# Patient Record
Sex: FEMALE | Race: WHITE | NOT HISPANIC OR LATINO | Employment: FULL TIME | URBAN - METROPOLITAN AREA
[De-identification: names, ages, dates, MRNs, and addresses within clinical notes are randomized per-mention and may not be internally consistent; named-entity substitution may affect disease eponyms.]

---

## 2018-06-01 ENCOUNTER — OFFICE VISIT CONVERTED (OUTPATIENT)
Dept: FAMILY MEDICINE CLINIC | Facility: CLINIC | Age: 53
End: 2018-06-01
Attending: NURSE PRACTITIONER

## 2018-06-01 ENCOUNTER — CONVERSION ENCOUNTER (OUTPATIENT)
Dept: FAMILY MEDICINE CLINIC | Facility: CLINIC | Age: 53
End: 2018-06-01

## 2018-07-02 ENCOUNTER — CONVERSION ENCOUNTER (OUTPATIENT)
Dept: FAMILY MEDICINE CLINIC | Facility: CLINIC | Age: 53
End: 2018-07-02

## 2018-07-02 ENCOUNTER — OFFICE VISIT CONVERTED (OUTPATIENT)
Dept: FAMILY MEDICINE CLINIC | Facility: CLINIC | Age: 53
End: 2018-07-02
Attending: NURSE PRACTITIONER

## 2018-08-29 ENCOUNTER — OFFICE VISIT CONVERTED (OUTPATIENT)
Dept: FAMILY MEDICINE CLINIC | Facility: CLINIC | Age: 53
End: 2018-08-29
Attending: NURSE PRACTITIONER

## 2018-08-29 ENCOUNTER — CONVERSION ENCOUNTER (OUTPATIENT)
Dept: FAMILY MEDICINE CLINIC | Facility: CLINIC | Age: 53
End: 2018-08-29

## 2020-04-10 ENCOUNTER — OFFICE VISIT CONVERTED (OUTPATIENT)
Dept: FAMILY MEDICINE CLINIC | Facility: CLINIC | Age: 55
End: 2020-04-10
Attending: NURSE PRACTITIONER

## 2020-04-21 ENCOUNTER — OFFICE VISIT CONVERTED (OUTPATIENT)
Dept: FAMILY MEDICINE CLINIC | Facility: CLINIC | Age: 55
End: 2020-04-21
Attending: NURSE PRACTITIONER

## 2020-10-20 ENCOUNTER — HOSPITAL ENCOUNTER (OUTPATIENT)
Dept: LAB | Facility: HOSPITAL | Age: 55
Discharge: HOME | End: 2020-10-20
Attending: NURSE PRACTITIONER

## 2020-10-20 LAB
ALBUMIN SERPL-MCNC: 4.3 G/DL (ref 3.5–5)
ALBUMIN/GLOB SERPL: 1.5 {RATIO} (ref 1.4–2.6)
ALP SERPL-CCNC: 123 U/L (ref 53–141)
ALT SERPL-CCNC: 17 U/L (ref 10–40)
ANION GAP SERPL CALC-SCNC: 18 MMOL/L (ref 8–19)
AST SERPL-CCNC: 17 U/L (ref 15–50)
BASOPHILS # BLD AUTO: 0.02 10*3/UL (ref 0–0.2)
BASOPHILS NFR BLD AUTO: 0.3 % (ref 0–3)
BILIRUB SERPL-MCNC: 0.32 MG/DL (ref 0.2–1.3)
BUN SERPL-MCNC: 18 MG/DL (ref 5–25)
BUN/CREAT SERPL: 23 {RATIO} (ref 6–20)
CALCIUM SERPL-MCNC: 9.2 MG/DL (ref 8.7–10.4)
CHLORIDE SERPL-SCNC: 101 MMOL/L (ref 99–111)
CHOLEST SERPL-MCNC: 256 MG/DL (ref 107–200)
CHOLEST/HDLC SERPL: 6.6 {RATIO} (ref 3–6)
CONV ABS IMM GRAN: 0.01 10*3/UL (ref 0–0.2)
CONV CO2: 22 MMOL/L (ref 22–32)
CONV IMMATURE GRAN: 0.1 % (ref 0–1.8)
CONV TOTAL PROTEIN: 7.1 G/DL (ref 6.3–8.2)
CREAT UR-MCNC: 0.79 MG/DL (ref 0.5–0.9)
DEPRECATED RDW RBC AUTO: 43.4 FL (ref 36.4–46.3)
EOSINOPHIL # BLD AUTO: 0.22 10*3/UL (ref 0–0.7)
EOSINOPHIL # BLD AUTO: 3.1 % (ref 0–7)
ERYTHROCYTE [DISTWIDTH] IN BLOOD BY AUTOMATED COUNT: 14.5 % (ref 11.7–14.4)
GFR SERPLBLD BASED ON 1.73 SQ M-ARVRAT: >60 ML/MIN/{1.73_M2}
GLOBULIN UR ELPH-MCNC: 2.8 G/DL (ref 2–3.5)
GLUCOSE SERPL-MCNC: 222 MG/DL (ref 65–99)
HCT VFR BLD AUTO: 38.2 % (ref 37–47)
HDLC SERPL-MCNC: 39 MG/DL (ref 40–60)
HGB BLD-MCNC: 12.3 G/DL (ref 12–16)
LDLC SERPL CALC-MCNC: 168 MG/DL (ref 70–100)
LYMPHOCYTES # BLD AUTO: 2.08 10*3/UL (ref 1–5)
LYMPHOCYTES NFR BLD AUTO: 29.2 % (ref 20–45)
MCH RBC QN AUTO: 26.7 PG (ref 27–31)
MCHC RBC AUTO-ENTMCNC: 32.2 G/DL (ref 33–37)
MCV RBC AUTO: 82.9 FL (ref 81–99)
MONOCYTES # BLD AUTO: 0.39 10*3/UL (ref 0.2–1.2)
MONOCYTES NFR BLD AUTO: 5.5 % (ref 3–10)
NEUTROPHILS # BLD AUTO: 4.41 10*3/UL (ref 2–8)
NEUTROPHILS NFR BLD AUTO: 61.8 % (ref 30–85)
NRBC CBCN: 0 % (ref 0–0.7)
OSMOLALITY SERPL CALC.SUM OF ELEC: 293 MOSM/KG (ref 273–304)
PLATELET # BLD AUTO: 236 10*3/UL (ref 130–400)
PMV BLD AUTO: 11.1 FL (ref 9.4–12.3)
POTASSIUM SERPL-SCNC: 4.2 MMOL/L (ref 3.5–5.3)
RBC # BLD AUTO: 4.61 10*6/UL (ref 4.2–5.4)
SODIUM SERPL-SCNC: 137 MMOL/L (ref 135–147)
TRIGL SERPL-MCNC: 246 MG/DL (ref 40–150)
TSH SERPL-ACNC: 3.28 M[IU]/L (ref 0.27–4.2)
VLDLC SERPL-MCNC: 49 MG/DL (ref 5–37)
WBC # BLD AUTO: 7.13 10*3/UL (ref 4.8–10.8)

## 2020-10-22 ENCOUNTER — HOSPITAL ENCOUNTER (OUTPATIENT)
Dept: LAB | Facility: HOSPITAL | Age: 55
Discharge: HOME | End: 2020-10-22
Attending: NURSE PRACTITIONER

## 2020-10-22 LAB
EST. AVERAGE GLUCOSE BLD GHB EST-MCNC: 235 MG/DL
HBA1C MFR BLD: 9.8 % (ref 3.5–5.7)

## 2020-10-23 ENCOUNTER — OFFICE VISIT CONVERTED (OUTPATIENT)
Dept: FAMILY MEDICINE CLINIC | Facility: CLINIC | Age: 55
End: 2020-10-23
Attending: NURSE PRACTITIONER

## 2020-10-23 ENCOUNTER — CONVERSION ENCOUNTER (OUTPATIENT)
Dept: FAMILY MEDICINE CLINIC | Facility: CLINIC | Age: 55
End: 2020-10-23

## 2020-12-16 ENCOUNTER — OFFICE VISIT CONVERTED (OUTPATIENT)
Dept: FAMILY MEDICINE CLINIC | Facility: CLINIC | Age: 55
End: 2020-12-16
Attending: NURSE PRACTITIONER

## 2021-01-07 ENCOUNTER — OFFICE VISIT CONVERTED (OUTPATIENT)
Dept: OTOLARYNGOLOGY | Facility: CLINIC | Age: 56
End: 2021-01-07
Attending: NURSE PRACTITIONER

## 2021-05-10 NOTE — H&P
History and Physical      Patient Name: Clementina Rios   Patient ID: [id number]   Sex: Female   YOB: 1965    Primary Care Provider: Daniel KNOX   Referring Provider: Daniel KNOX    Visit Date: January 7, 2021    Provider: ABILIO Jimenez   Location: [facility]   Location Address: [address] [place] [address], [address] [place] [address]  [id number]   Location Phone: [phone]          Chief Complaint     1. History of recurrent otitis media    2. Chronic rhinitis       History Of Present Illness  Clementina Rios is a 55 year old /White female who presents to the office today as a consult from Daniel KNOX.      She presents to the For evaluation of her ears and nose.  She reports for the past 20 years she has had ongoing issues with recurrent otitis media infections.  She states that she often has fluid in her middle ears.  She states that her hearing is muffled and she will occasionally have otalgia bilaterally.  However, it is worse on the right.  States that she has a frequent fullness sensation in her ears.  She denies any otorrhea.  She has never had any ear surgeries.  She does report chronic rhinitis.  She states that it is clear in nature but often pours out of her nose throughout the day.  It is worse whenever she eats or when she is up moving around.  She states that approximately a year ago she had some dental work done and since that time she has not been able to smell or taste.  She reports that she will occasionally be able to smell strong odors such as perfumes but typically she cannot smell much of anything.  She denies any recent head trauma but reports she has had 3 separate nasal fractures with the last 1 occurring in 2014.  She takes montelukast daily and was also prescribed azelastine nasal spray.  She reports that she only takes azelastine spray as needed and is not doing it on a daily basis.  She has not been seen by an allergist.  She  "denies any issues with recurrent sinusitis.       Past Medical History  Cystocele; HBP (high blood pressure); Head injury; Hemorrhoids; HLD (hyperlipidemia); Night sweat; Renal calculus or stone; Seasonal allergies; Sinus trouble; Sinusitis, acute; DIONI (stress urinary incontinence, female)         Past Surgical History  Bladder Repair; Colon Resection; Colonoscopy; Hysterectomy; MENISCUS TEAR; Oopherectomy         Medication List  azelastine 137 mcg (0.1 %) nasal aerosol,spray; buspirone 7.5 mg oral tablet; Jardiance 25 mg oral tablet; Lunesta 3 mg oral tablet; metformin 500 mg oral tablet extended release 24 hr; Singulair 10 mg oral tablet; Zetia 10 mg oral tablet; Zofran 4 mg oral tablet; Zoloft 50 mg oral tablet         Allergy List  NO KNOWN DRUG ALLERGIES         Family Medical History  Renal Calculus; Family history of heart disease; Family history of diabetes mellitus         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); Sales; ; 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)         Immunizations  Name Date Admin   Influenza Refused         Review of Systems  · Constitutional  o Denies  o : fever, night sweats, weight loss  · Eyes  o Denies  o : discharge from eye, impaired vision  · HENT  o Admits  o : *See HPI  · Cardiovascular  o Admits  o : irregular heart beats  · Respiratory  o Admits  o : shortness of breath  o Denies  o : wheezing, coughing up blood  · Gastrointestinal  o Denies  o : heartburn, reflux, vomiting blood  · Genitourinary  o Admits  o : frequency  · Integument  o Admits  o : skin dryness  o Denies  o : rash  · Neurologic  o Denies  o : seizures, loss of balance, loss of consciousness, dizziness  · Endocrine  o Denies  o : cold intolerance, heat intolerance  · Heme-Lymph  o Denies  o : easy bleeding, anemia      Vitals  Date Time BP Position Site L\R Cuff Size HR RR TEMP (F) WT  HT  BMI kg/m2 BSA m2 O2 Sat FR L/min FiO2 HC       01/07/2021 10:43 AM        97.3 213lbs 16oz 5'  6\" 34.54 2.13             Physical " Examination  · Constitutional  o Appearance  o : well developed, well-nourished, alert and in no acute distress, voice clear and strong  · Head and Face  o Head  o :   § Inspection  § : no deformities or lesions  o Face  o :   § Inspection  § : No facial lesions; House-Brackmann I/VI bilaterally  § Palpation  § : No TMJ crepitus nor  muscle tenderness bilaterally  · Eyes  o Vision  o :   § Visual Fields  § : Extraocular movements are intact. No spontaneous or gaze-induced nystagmus.  o Conjunctivae  o : clear  o Sclerae  o : clear  o Pupils and Irises  o : pupils equal, round, and reactive to light.   · Ears, Nose, Mouth and Throat  o Ears  o :   § External Ears  § : appearance within normal limits, no lesions present  § Otoscopic Examination  § : tympanic membrane appearance within normal limits bilaterally without perforations, well-aerated middle ears  § Hearing  § : intact to conversational voice both ears. Tuning fork testing: Griffin: No lateralization. Rinne: Positive bilaterally  o Nose  o :   § External Nose  § : appearance normal  § Intranasal Exam  § : mucosa within normal limits, vestibules normal, no intranasal lesions present, septum midline, sinuses non tender to percussion  o Oral Cavity  o :   § Oral Mucosa  § : oral mucosa normal without pallor or cyanosis  § Lips  § : lip appearance normal  § Teeth  § : normal dentition for age  § Gums  § : gums pink, non-swollen, no bleeding present  § Tongue  § : tongue appearance normal; normal mobility  § Palate  § : hard palate normal, soft palate appearance normal with symmetric mobility  o Throat  o :   § Oropharynx  § : no inflammation or lesions present, tonsils within normal limits  · Neck  o Inspection/Palpation  o : normal appearance, no masses or tenderness, trachea midline; thyroid size normal, nontender, no nodules or masses present on palpation  · Respiratory  o Respiratory Effort  o : breathing unlabored  o Inspection of Chest  o : normal  appearance, no retractions  · Lymphatic  o Neck  o : no lymphadenopathy present  o Supraclavicular Nodes  o : no lymphadenopathy present  o Preauricular Nodes  o : no lymphadenopathy present  · Skin and Subcutaneous Tissue  o General Inspection  o : Regarding face and neck - there are no rashes present, no lesions present, and no areas of discoloration  · Neurologic  o Cranial Nerves  o : cranial nerves II-XII are grossly intact bilaterally  o Gait and Station  o : normal gait, able to stand without diffculty  · Psychiatric  o Judgement and Insight  o : judgment and insight intact  o Mood and Affect  o : mood normal, affect appropriate          Assessment  · Hearing loss     389.9/H91.90  · Chronic rhinitis     472.0/J31.0  · History of otitis media     V12.49/Z86.69      Plan  · Medications  o Neilmed Sinus Rinse Complete sinus irrigation packet with rinse device   SIG: Take 1 packet with rinse device by sinus irrigation route daily   DISP: (60) Dose Pack with 1 refills  Prescribed on 01/07/2021     o fluticasone propionate 50 mcg/actuation nasal spray,suspension   SIG: spray 1 - 2 sprays (50 - 100 mcg) in each nostril by intranasal route once daily for 30 days   DISP: (1) Bottle with 2 refills  Prescribed on 01/07/2021     o Medications have been Reconciled  o Transition of Care or Provider Policy  · Instructions  o She presents to the For evaluation of her ears and nose. She reports for the past 20 years she has had ongoing issues with recurrent otitis media infections. She states that she often has fluid in her middle ears. She states that her hearing is muffled and she will occasionally have otalgia bilaterally. However, it is worse on the right. States that she has a frequent fullness sensation in her ears. She denies any otorrhea. She has never had any ear surgeries. She does report chronic rhinitis. She states that it is clear in nature but often pours out of her nose throughout the day. It is worse whenever  she eats or when she is up moving around. She states that approximately a year ago she had some dental work done and since that time she has not been able to smell or taste. She reports that she will occasionally be able to smell strong odors such as perfumes but typically she cannot smell much of anything. She denies any recent head trauma but reports she has had 3 separate nasal fractures with the last 1 occurring in 2014. She takes montelukast daily and was also prescribed azelastine nasal spray. She reports that she only takes azelastine spray as needed and is not doing it on a daily basis. She has not been seen by an allergist. She denies any issues with recurrent sinusitis. On examination today bilateral tympanic membrane appearance is within normal limits. She does have some calcium deposits present on her right tympanic membrane but I do not see any middle ear fluid. Tuning fork testing is normal with Griffin showing no lateralization and Rinne positive bilaterally. Her nose is free of any polyps or purulence. Hematoma start her on once daily sinus rinses and I will add fluticasone to her daily regimen. Advised her to use the azelastine spray as directed and not on an as-needed basis. I will see her back in 3 to 4 weeks at which time we will get an audiogram and tympanogram to further evaluate her ears.  o Electronically Identified Patient Education Materials Provided Electronically  · Correspondence  o ENT Letter to Referring MD (Daniel KNOX) - 01/07/2021            Electronically Signed by: ABILIO Jimenez -Author on January 7, 2021 01:00:54 PM

## 2021-05-12 NOTE — PROGRESS NOTES
Progress Note      Patient Name: Clementina Rios   Patient ID: [id number]   Sex: Female   YOB: 1965    Primary Care Provider: Daniel KNOX   Referring Provider: Daniel KNOX    Visit Date: April 10, 2020    Provider: ABILIO Dickerson   Location: [facility]   Location Address: [address] [place] [address], [address] [place] [address]  [id number]   Location Phone: [phone]          Chief Complaint     Cough, sinus pressure, body aches, cold chills, loose stools for 1-2 weeks       History Of Present Illness  Clementina Rios is a 55 year old /White female who presents for evaluation and treatment of:      Patient presents to the office today with complaints of sinus pressure congestion, general body aches, fever and chills.  Patient also states that she has had a cough.  She denies any shortness of breath at this time.  Patient does state that she has had mild nausea with the onset of symptoms.  She states that her symptoms have been present for 3 weeks.       Past Medical History  Disease Name Date Onset Notes   Cystocele --  --    HBP (high blood pressure) --  --    Head injury --  --    Hemorrhoids --  --    HLD (hyperlipidemia) --  --    Night sweat --  --    Renal calculus or stone --  --    Seasonal allergies --  --    Sinus trouble --  --    DIONI (stress urinary incontinence, female) --  --          Past Surgical History  Procedure Name Date Notes   Bladder Repair --  2014   Colon Resection --  --    Colonoscopy --  --    Hysterectomy --  --    MENISCUS TEAR --  --    Oopherectomy --  --          Medication List  Name Date Started Instructions   atorvastatin 40 mg oral tablet 06/07/2019 take 1 tablet (40 mg) by oral route once daily for 90 days   Claritin 10 mg oral tablet 08/30/2018 take 1 tablet (10 mg) by oral route once daily   Lunesta 3 mg oral tablet  take 1 tablet (3 mg) by oral route once daily at bedtime   spironolactone 25 mg oral tablet 06/07/2019 take 1 tablet  "(25 mg) by oral route every other day for 90 days   Zoloft 50 mg oral tablet  take 1 tablet (50 mg) by oral route once daily         Allergy List  Allergen Name Date Reaction Notes   NO KNOWN DRUG ALLERGIES --  --  --        Allergies Reconciled  Family Medical History  Disease Name Relative/Age Notes   Heart Disease Mother/   --    Renal Calculus Daughter/   --    Diabetes Father/   --          Social History  Finding Status Start/Stop Quantity Notes   Alcohol Never --/-- --  --    Sales --  --/-- --  --     --  --/-- --  4 children   Tobacco Never --/-- --  --          Review of Systems  · Constitutional  o Admits  o : chills, body aches  o Denies  o : fatigue, night sweats  · Eyes  o Denies  o : double vision, blurred vision  · HENT  o Admits  o : sinus pain  o Denies  o : vertigo, recent head injury  · Breasts  o Denies  o : abnormal changes in breast size, additional breast symptoms except as noted in the HPI  · Cardiovascular  o Denies  o : chest pain, irregular heart beats  · Respiratory  o Admits  o : cough  o Denies  o : shortness of breath, productive cough  · Gastrointestinal  o Denies  o : nausea, vomiting  · Genitourinary  o Denies  o : dysuria, urinary retention  · Integument  o Denies  o : hair growth change, new skin lesions  · Neurologic  o Denies  o : altered mental status, seizures  · Musculoskeletal  o Denies  o : joint swelling, limitation of motion  · Endocrine  o Denies  o : cold intolerance, heat intolerance  · Heme-Lymph  o Denies  o : petechiae, lymph node enlargement or tenderness  · Allergic-Immunologic  o Denies  o : frequent illnesses      Vitals  Date Time BP Position Site L\R Cuff Size HR RR TEMP (F) WT  HT  BMI kg/m2 BSA m2 O2 Sat HC       04/10/2020 09:28 /74 Sitting    62 - R 18 96.2  6'  6\"   95 %          Physical Examination  · Constitutional  o Appearance  o : well developed, well-nourished, no obvious deformities present  · Ears, Nose, Mouth and " Throat  o Ears  o :   § External Ears  § : appearance within normal limits, no lesions present  § Otoscopic Examination  § : fluid bubbles present behind tympanic membrane   § Hearing  § : intact to conversational voice both ears  o Nose  o :   § External Nose  § : appearance normal  § Intranasal Exam  § : maxillary sinus tender to exam by percussion   § Nasopharynx  § : no lesions or inflammation  o Oral Cavity  o :   § Oral Mucosa  § : oral mucosa normal without pallor or cyanosis  § Lips  § : lip appearance normal  § Teeth  § : normal dentition for age  § Gums  § : gums pink, non-swollen, no bleeding present  § Tongue  § : tongue appearance normal  § Palate  § : hard palate normal, soft palate appearance normal  o Throat  o :   § Oropharynx  § : no inflammation or lesions present, tonsils within normal limits  § Hypopharynx  § : appearance within normal limits, superior epiglottis within normal limits  · Respiratory  o Respiratory Effort  o : breathing unlabored  o Auscultation of Lungs  o : normal breath sounds throughout  · Cardiovascular  o Heart  o :   § Auscultation of Heart  § : regular rate and rhythm, no murmurs, gallops or rubs  o Peripheral Vascular System  o :   § Extremities  § : no cyanosis, clubbing or edema  · Psychiatric  o General  o : Appropriate mood and affect noted  o Mood and Affect  o : mood normal, affect appropriate  o Presence of Abnormal Thoughts  o : no hallucinations, no delusions present, no psychotic thoughts          Results  · In-Office Procedures  o Lab procedure  § Rapid strep screen (96820)   § Beta Strep Gp A Culture: Negative   § Internal Control Verified?: Yes   § IOP - Influenza A/B Test (04325)   § Influenza A: Negative   § Influenza B: Negative   § Internal Control Verified?: Yes       Assessment  · Sinusitis, acute     461.9/J01.90  · Eustachian tube dysfunction, bilateral     381.81/H69.83    Problems Reconciled  Plan  · Orders  o ACO-39: Current medications updated and  reviewed () - - 04/10/2020  o ACO-14: Influenza immunization was not administered for reasons documented () - - 04/10/2020  · Medications  o Augmentin 875-125 mg oral tablet   SIG: take 1 tablet by oral route every 12 hours for 10 days   DISP: (20) tablets with 0 refills  Prescribed on 04/10/2020     o ondansetron 4 mg oral tablet,disintegrating   SIG: dissolve 1 tablet by oral route every 12 hours as needed   DISP: (20) tablets with 0 refills  Prescribed on 04/10/2020     o azelastine 137 mcg (0.1 %) nasal aerosol,spray   SIG: spray 2 sprays in each nostril by intranasal route 2 times per day   DISP: (1) 30 ml squeez btl with 0 refills  Prescribed on 04/10/2020     o Medications have been Reconciled  o Transition of Care or Provider Policy  · Instructions  o Rest. Increase Fluids.  o Patient was educated/instructed on their diagnosis, treatment and medications prior to discharge from the clinic today.  o Time spent with the patient was minutes, more than 50% face to face.  o Electronically Identified Patient Education Materials Provided Electronically  · Disposition  o Call or Return if symptoms worsen or persist.            Electronically Signed by: ABILIO Dickerson -Author on April 10, 2020 09:57:56 AM

## 2021-05-12 NOTE — PROGRESS NOTES
Progress Note      Patient Name: Clementina Rios   Patient ID: [id number]   Sex: Female   YOB: 1965    Primary Care Provider: Daniel KNOX   Referring Provider: Daniel KNOX    Visit Date: April 21, 2020    Provider: ABILIO Dickerson   Location: [facility]   Location Address: [address] [place] [address], [address]  [id number]   Location Phone: [phone]          Chief Complaint  · Right ear pain/ pressure since 4/20/20  · PND, cough for 1 week  · Patient is currently on Augmentin and nasal spray      History Of Present Illness  Clementina Rios is a 55 year old /White female who presents for evaluation and treatment of:      Patient presents to the office today with complaints of right ear pain.  She states that she has been having a pressure sensation since yesterday.  Patient also states that she has postnasal drip and is currently taking Augmentin for sinus infection.  Patient is not currently taking any allergy medicine other than her Astelin       Past Medical History  Cystocele; HBP (high blood pressure); Head injury; Hemorrhoids; HLD (hyperlipidemia); Night sweat; Renal calculus or stone; Seasonal allergies; Sinus trouble; DIONI (stress urinary incontinence, female)         Past Surgical History  Bladder Repair; Colon Resection; Colonoscopy; Hysterectomy; MENISCUS TEAR; Oopherectomy         Medication List  atorvastatin 40 mg oral tablet; Augmentin 875-125 mg oral tablet; azelastine 137 mcg (0.1 %) nasal aerosol,spray; Lunesta 3 mg oral tablet; ondansetron 4 mg oral tablet,disintegrating; Zoloft 50 mg oral tablet         Allergy List  NO KNOWN DRUG ALLERGIES         Family Medical History  Heart Disease; Renal Calculus; Diabetes         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); Sales; ; 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)         Immunizations  Name Date Admin   Influenza Refused         Review of Systems  · Constitutional  o Denies  o : fever, fatigue, weight loss, weight  "gain  · HENT  o Admits  o : postnasal drip, ear pain, ear fullness  · Cardiovascular  o Denies  o : lower extremity edema, claudication, chest pressure, palpitations  · Respiratory  o Admits  o : cough  o Denies  o : shortness of breath, wheezing, hemoptysis, dyspnea on exertion  · Gastrointestinal  o Denies  o : nausea, vomiting, diarrhea, constipation, abdominal pain      Vitals  Date Time BP Position Site L\R Cuff Size HR RR TEMP (F) WT  HT  BMI kg/m2 BSA m2 O2 Sat        04/21/2020 10:24 /78 Sitting    78 - R 18 97.9 210lbs 0oz 5'  6\" 33.89 2.11 95 %          Physical Examination  · Constitutional  o Appearance  o : well developed, well-nourished, no obvious deformities present  · Ears, Nose, Mouth and Throat  o Ears  o :   § External Ears  § : appearance within normal limits, no lesions present  § Otoscopic Examination  § : Fluid noted behind TM on right, no erythema noted left ear unremarkable with no erythema edema or perforation  § Hearing  § : intact to conversational voice both ears  o Nose  o :   § External Nose  § : appearance normal  § Intranasal Exam  § : mucosa within normal limits, vestibules normal, no intranasal lesions present, septum midline, sinuses non tender to percussion  § Nasopharynx  § : no lesions or inflammation  o Oral Cavity  o :   § Oral Mucosa  § : oral mucosa normal without pallor or cyanosis  § Lips  § : lip appearance normal  § Teeth  § : normal dentition for age  § Gums  § : gums pink, non-swollen, no bleeding present  § Tongue  § : tongue appearance normal  § Palate  § : hard palate normal, soft palate appearance normal  o Throat  o :   § Oropharynx  § : no inflammation or lesions present, tonsils within normal limits  § Hypopharynx  § : appearance within normal limits, superior epiglottis within normal limits  · Respiratory  o Respiratory Effort  o : breathing unlabored  o Auscultation of Lungs  o : normal breath sounds throughout  · Cardiovascular  o Heart  o : "   § Auscultation of Heart  § : regular rate and rhythm, no murmurs, gallops or rubs  o Peripheral Vascular System  o :   § Extremities  § : no cyanosis, clubbing or edema  · Psychiatric  o General  o : Appropriate mood and affect noted  o Mood and Affect  o : mood normal, affect appropriate  o Presence of Abnormal Thoughts  o : no hallucinations, no delusions present, no psychotic thoughts          Assessment  · Eustachian tube dysfunction, right     381.81/H69.81    Problems Reconciled  Plan  · Orders  o ACO-39: Current medications updated and reviewed () - - 04/21/2020  · Medications  o azelastine 137 mcg (0.1 %) nasal aerosol,spray   SIG: spray 2 sprays in each nostril by intranasal route 2 times per day   DISP: (1) 30 ml squeez btl with 0 refills  Refilled on 04/21/2020     o Claritin 10 mg oral tablet   SIG: take 1 tablet (10 mg) by oral route once daily   DISP: (30) tablets with 2 refills  Discontinued on 04/21/2020     o spironolactone 25 mg oral tablet   SIG: take 1 tablet (25 mg) by oral route every other day for 90 days   DISP: (90) tablets with 1 refills  Discontinued on 04/21/2020     o Medications have been Reconciled  o Transition of Care or Provider Policy  · Instructions  o Patient was educated/instructed on their diagnosis, treatment and medications prior to discharge from the clinic today.  o Time spent with the patient was minutes, more than 50% face to face.  · Disposition  o Call or Return if symptoms worsen or persist.            Electronically Signed by: ABILIO Dickerson -Author on April 21, 2020 10:54:18 AM

## 2021-05-13 NOTE — PROGRESS NOTES
Progress Note      Patient Name: Clementina Rios   Patient ID: [id number]   Sex: Female   YOB: 1965    Primary Care Provider: Daniel KNOX   Referring Provider: Daniel KNOX    Visit Date: October 23, 2020    Provider: ABILIO Dickerson   Location: [facility] [place]   Location Address: [address] [place] [address], [address] [place] [address]  [id number]   Location Phone: [phone]          Chief Complaint  · Talk about medications      History Of Present Illness  Clementina Rios is a 55 year old /White female who presents for evaluation and treatment of:      Patient presents to the office today to discuss her lab work.  I did discuss new onset diabetes with an A1c of 9.8%.  Patient does state that she eats a lot of carbs.  Education was provided to the patient regarding reducing her sugars and her carb intake.  Did discuss starting medications on the patient to help reduce her glucose levels.  Patient also had elevated LDL.  She states that she has not been taking her atorvastatin for the past 3 to 6 months.  Patient states that she has terrible myalgias on this medication.  I did discuss starting Crestor on the patient as it has lessened side effects.  Patient refuses to take Crestor at this time as well.       Past Medical History  Disease Name Date Onset Notes   Cystocele --  --    HBP (high blood pressure) --  --    Head injury --  --    Hemorrhoids --  --    HLD (hyperlipidemia) --  --    Night sweat --  --    Renal calculus or stone --  --    Seasonal allergies --  --    Sinus trouble --  --    DIONI (stress urinary incontinence, female) --  --          Past Surgical History  Procedure Name Date Notes   Bladder Repair --  2014   Colon Resection --  --    Colonoscopy --  --    Hysterectomy --  --    MENISCUS TEAR --  --    Oopherectomy --  --          Medication List  Name Date Started Instructions   azelastine 137 mcg (0.1 %) nasal aerosol,spray 04/21/2020 spray 2  sprays in each nostril by intranasal route 2 times per day   buspirone 7.5 mg oral tablet  take 1 tablet (7.5 mg) by oral route 2 times per day   Lunesta 3 mg oral tablet  take 1 tablet (3 mg) by oral route once daily at bedtime   Synjardy XR 25-1,000 mg oral tablet, IR - ER, biphasic 24hr 10/23/2020 take 1 tablet by oral route once daily in the morning with a meal   Zetia 10 mg oral tablet 10/23/2020 take 1 tablet (10 mg) by oral route once daily   Zoloft 50 mg oral tablet  take 1 tablet (50 mg) by oral route once daily         Allergy List  Allergen Name Date Reaction Notes   NO KNOWN DRUG ALLERGIES --  --  --        Allergies Reconciled  Family Medical History  Disease Name Relative/Age Notes   Heart Disease Mother/   --    Renal Calculus Daughter/   --    Diabetes Father/   --          Social History  Finding Status Start/Stop Quantity Notes   Alcohol Never --/-- --  --    Sales --  --/-- --  --     --  --/-- --  4 children   Tobacco Never --/-- --  --          Immunizations  NameDate Admin Mfg Trade Name Lot Number Route Inj VIS Given VIS Publication   InfluenzaRefused 04/10/2020 NE Not Entered  NE NE     Comments:          Review of Systems  · Constitutional  o Denies  o : fatigue, night sweats  · Eyes  o Denies  o : double vision, blurred vision  · HENT  o Denies  o : vertigo, recent head injury  · Breasts  o Denies  o : abnormal changes in breast size, additional breast symptoms except as noted in the HPI  · Cardiovascular  o Denies  o : chest pain, irregular heart beats  · Respiratory  o Denies  o : shortness of breath, productive cough  · Gastrointestinal  o Denies  o : nausea, vomiting  · Genitourinary  o Denies  o : dysuria, urinary retention  · Integument  o Denies  o : hair growth change, new skin lesions  · Neurologic  o Denies  o : altered mental status, seizures  · Musculoskeletal  o Denies  o : joint swelling, limitation of motion  · Endocrine  o Denies  o : cold intolerance, heat  "intolerance  · Heme-Lymph  o Denies  o : petechiae, lymph node enlargement or tenderness  · Allergic-Immunologic  o Denies  o : frequent illnesses      Vitals  Date Time BP Position Site L\R Cuff Size HR RR TEMP (F) WT  HT  BMI kg/m2 BSA m2 O2 Sat FR L/min FiO2 HC       10/23/2020 10:22 /80 Sitting    74 - R  97 213lbs 16oz 5'  6\" 34.54 2.13 98 %            Physical Examination  · Constitutional  o Appearance  o : well-nourished, in no acute distress  · Neck  o Inspection/Palpation  o : normal appearance, no masses or tenderness, trachea midline  o Range of Motion  o : cervical range of motion within normal limits  o Thyroid  o : gland size normal, nontender, no nodules or masses present on palpation  · Respiratory  o Respiratory Effort  o : breathing unlabored  o Inspection of Chest  o : normal appearance  o Auscultation of Lungs  o : normal breath sounds throughout inspiration and expiration  · Cardiovascular  o Heart  o :   § Auscultation of Heart  § : regular rate and rhythm, no murmurs, gallops or rubs  o Peripheral Vascular System  o :   § Extremities  § : no clubbing or edema  · Skin and Subcutaneous Tissue  o General Inspection  o : no rashes or lesions present, no areas of discoloration  o Body Hair  o : hair normal for age, general body hair distribution normal for age  o Digits and Nails  o : no clubbing, cyanosis, deformities or edema present, normal appearing nails  · Neurologic  o Mental Status Examination  o :   § Orientation  § : grossly oriented to person, place and time  o Gait and Station  o : normal gait, able to stand without difficulty  · Psychiatric  o Judgement and Insight  o : judgment and insight intact  o Mood and Affect  o : mood normal, affect appropriate  o Presence of Abnormal Thoughts  o : no hallucinations, no delusions present, no psychotic thoughts          Assessment  · Screening for depression     V79.0/Z13.89  · Diabetes mellitus, type 2     250.00/E11.9  · Essential " hypertension     401.9/I10  · Hyperlipidemia     272.4/E78.5      Plan  · Orders  o Annual depression screening, 15 minutes (62841, ) - V79.0/Z13.89 - 10/23/2020  o ACO-18: Negative screen for clinical depression using a standardized tool () - V79.0/Z13.89 - 10/23/2020  o Diabetes 2 Panel (Urine Microalbumin, CMP, Lipid, A1c, ) [facility] (22608, 73666, [address]2314, [address]157) - 250.00/E11.9 - 04/23/2021  o ACO-39: Current medications updated and reviewed (1159F, ) - - 10/23/2020  o ACO-18: Negative screen for clinical depression using a standardized tool () - - 10/23/2020  o ACO-40: Depression Remission at 12 months as demonstrated by a 12 month repeat PHQ-9 of less than 5 () - - 10/23/2020  · Medications  o Zetia 10 mg oral tablet   SIG: take 1 tablet (10 mg) by oral route once daily   DISP: (90) Tablet with 1 refills  Prescribed on 10/23/2020     o Synjardy XR 25-1,000 mg oral tablet, IR - ER, biphasic 24hr   SIG: take 1 tablet by oral route once daily in the morning with a meal   DISP: (90) Tablet with 1 refills  Prescribed on 10/23/2020     o Medications have been Reconciled  o Transition of Care or Provider Policy  · Instructions  o Depression Screen completed and scanned into the EMR under the designated folder within the patient's documents.  o Today's PHQ-9 result is _4__  o Daily foot care. Avoid walking barefoot. Annual Dilated Eye Exam.  o Discussed with patient blood pressure monitoring, hemoglobin A1C levels need to be below 7.0, and LDL (Lipid) goals below 70.  o Advised that cheeses and other sources of dairy fats, animal fats, fast food, and the extras (candy, pastries, pies, doughnuts and cookies) all contain LDL raising nutrients. Advised to increase fruits, vegetables, whole grains, and to monitor portion sizes.   o Patient was educated/instructed on their diagnosis, treatment and medications prior to discharge from the clinic today.  o Time spent with the patient was minutes, more than  50% face to face.  o Electronically Identified Patient Education Materials Provided Electronically  · Disposition  o Call or Return if symptoms worsen or persist.  o Follow up in 3 months            Electronically Signed by: ABILIO Dickerson -Author on October 23, 2020 11:48:52 AM

## 2021-05-14 VITALS
BODY MASS INDEX: 34.39 KG/M2 | HEIGHT: 66 IN | WEIGHT: 214 LBS | SYSTOLIC BLOOD PRESSURE: 126 MMHG | HEART RATE: 74 BPM | TEMPERATURE: 97 F | OXYGEN SATURATION: 98 % | DIASTOLIC BLOOD PRESSURE: 80 MMHG

## 2021-05-14 VITALS — TEMPERATURE: 97.3 F | HEIGHT: 66 IN | BODY MASS INDEX: 34.39 KG/M2 | WEIGHT: 214 LBS

## 2021-05-14 VITALS
HEIGHT: 66 IN | SYSTOLIC BLOOD PRESSURE: 132 MMHG | DIASTOLIC BLOOD PRESSURE: 80 MMHG | HEART RATE: 70 BPM | TEMPERATURE: 97.4 F | WEIGHT: 214 LBS | BODY MASS INDEX: 34.39 KG/M2 | OXYGEN SATURATION: 98 %

## 2021-05-14 NOTE — PROGRESS NOTES
Progress Note      Patient Name: Clementina Rios   Patient ID: [id number]   Sex: Female   YOB: 1965    Primary Care Provider: Daniel KNOX   Referring Provider: Daniel KNOX    Visit Date: December 16, 2020    Provider: ABILIO Dickerson   [place] [facility] [place]   [place] Address: [address] [place] [address], [address]  [place], [place]  [id number]   Location Phone: [phone]          Chief Complaint  · Bilateral ear and sinus pain and drainage       History Of Present Illness  Clementina Rios is a 55 year old /White female who presents for evaluation and treatment of:      Presents to the office today with complaints of sinus congestion, ear pressure and postnasal drip.  Patient states symptoms have been present for couple weeks and does not seem to getting better.  Patient has been using her Stahist as well as Astelin states that the ear continues to cause pain.  Patient also states that she has not had any smell or taste for approximately 10 months but contributes this to dental procedures she had about the same time that occurred  She denies any fevers, nausea vomiting or diarrhea.  She denies being around anybody sick recently.    This has been an ongoing problem and we have discussed a referral to ENT in the past.  Patient states that she would like to go ahead and have that referral placed and she will call the office and make an appointment when it is convenient.       Past Medical History  Disease Name Date Onset Notes   Cystocele --  --    HBP (high blood pressure) --  --    Head injury --  --    Hemorrhoids --  --    HLD (hyperlipidemia) --  --    Night sweat --  --    Renal calculus or stone --  --    Seasonal allergies --  --    Sinus trouble --  --    DIONI (stress urinary incontinence, female) --  --          Past Surgical History  Procedure Name Date Notes   Bladder Repair --  2014   Colon Resection --  --    Colonoscopy --  --    Hysterectomy --   + abdominal pain during hospitalization  CT scan ordered showed umbilical hernia  General surgery consulted and recommended outpatient follow up    --    MENISCUS TEAR --  --    Oopherectomy --  --          Medication List  Name Date Started Instructions   azelastine 137 mcg (0.1 %) nasal aerosol,spray 04/21/2020 spray 2 sprays in each nostril by intranasal route 2 times per day   buspirone 7.5 mg oral tablet  take 1 tablet (7.5 mg) by oral route 2 times per day   Jardiance 25 mg oral tablet 10/27/2020 take 1 tablet (25 mg) by oral route once daily in the morning   Lunesta 3 mg oral tablet  take 1 tablet (3 mg) by oral route once daily at bedtime   metformin 500 mg oral tablet extended release 24 hr 11/03/2020 take 1 tablet by oral route daily   Stahist AD 25-60 mg oral tablet  take 1 tablet by oral route daily   Zetia 10 mg oral tablet 10/23/2020 take 1 tablet (10 mg) by oral route once daily   Zofran 4 mg oral tablet 11/03/2020 1 tablet q 4-6 hrs as needed   Zoloft 50 mg oral tablet  take 1 tablet (50 mg) by oral route once daily         Allergy List  Allergen Name Date Reaction Notes   NO KNOWN DRUG ALLERGIES --  --  --        Allergies Reconciled  Family Medical History  Disease Name Relative/Age Notes   Heart Disease Mother/   --    Renal Calculus Daughter/   --    Diabetes Father/   --          Social History  Finding Status Start/Stop Quantity Notes   Alcohol Never --/-- --  --    Sales --  --/-- --  --     --  --/-- --  4 children   Tobacco Never --/-- --  --          Immunizations  NameDate Admin Mfg Trade Name Lot Number Route Inj VIS Given VIS Publication   InfluenzaRefused 04/10/2020 NE Not Entered  NE NE     Comments:          Review of Systems  · Constitutional  o Denies  o : fatigue, night sweats  · Eyes  o Denies  o : double vision, blurred vision  · HENT  o Admits  o : sinus pain, ear fullness  o Denies  o : vertigo, recent head injury  · Breasts  o Denies  o : abnormal changes in breast size, additional breast symptoms except as noted in the HPI  · Cardiovascular  o Denies  o : chest pain, irregular heart  "beats  · Gastrointestinal  o Denies  o : nausea, vomiting  · Genitourinary  o Denies  o : dysuria, urinary retention  · Integument  o Denies  o : hair growth change, new skin lesions  · Neurologic  o Denies  o : altered mental status, seizures  · Musculoskeletal  o Denies  o : joint swelling, limitation of motion  · Endocrine  o Denies  o : cold intolerance, heat intolerance  · Heme-Lymph  o Denies  o : petechiae, lymph node enlargement or tenderness  · Allergic-Immunologic  o Denies  o : frequent illnesses      Vitals  Date Time BP Position Site L\R Cuff Size HR RR TEMP (F) WT  HT  BMI kg/m2 BSA m2 O2 Sat FR L/min FiO2 HC       12/16/2020 11:29 /80 Sitting    70 - R  97.4 213lbs 16oz 5'  6\" 34.54 2.13 98 %            Physical Examination  · Constitutional  o Appearance  o : well-nourished, in no acute distress  · Eyes  o Conjunctivae  o : conjunctivae normal  o Sclerae  o : sclerae white  o Pupils and Irises  o : pupils equal and round  o Eyelids/Ocular Adnexae  o : eyelid appearance normal, no exudates present  · Ears, Nose, Mouth and Throat  o Ears  o :   § External Ears  § : external auditory canal appearance within normal limits, no discharge present  § Otoscopic Examination  § : fluid bubbles present behind tympanic membrane   o Nose  o :   § External Nose  § : appearance normal  § Intranasal Exam  § : maxillary sinus tender to exam by percussion   § Nasopharynx  § : no discharge present  o Oral Cavity  o :   § Oral Mucosa  § : oral mucosa normal  § Lips  § : lip appearance normal  § Teeth  § : normal dentation for age  o Throat  o :   § Oropharynx  § : no inflammation or lesions present, tonsils within normal limits  · Neck  o Inspection/Palpation  o : normal appearance, no masses or tenderness, trachea midline  o Range of Motion  o : cervical range of motion within normal limits  o Thyroid  o : gland size normal, nontender, no nodules or masses present on palpation  · Respiratory  o Respiratory " Effort  o : breathing unlabored  o Inspection of Chest  o : normal appearance  o Auscultation of Lungs  o : normal breath sounds throughout inspiration and expiration  · Cardiovascular  o Heart  o :   § Auscultation of Heart  § : regular rate and rhythm, no murmurs, gallops or rubs  o Peripheral Vascular System  o :   § Extremities  § : no clubbing or edema  · Skin and Subcutaneous Tissue  o General Inspection  o : no rashes or lesions present, no areas of discoloration  o Body Hair  o : hair normal for age, general body hair distribution normal for age  o Digits and Nails  o : no clubbing, cyanosis, deformities or edema present, normal appearing nails  · Neurologic  o Mental Status Examination  o :   § Orientation  § : grossly oriented to person, place and time  o Gait and Station  o : normal gait, able to stand without difficulty  · Psychiatric  o Judgement and Insight  o : judgment and insight intact  o Mood and Affect  o : mood normal, affect appropriate  o Presence of Abnormal Thoughts  o : no hallucinations, no delusions present, no psychotic thoughts          Assessment  · Visit for screening mammogram     V76.12/Z12.31  · Diabetes mellitus, type 2     250.00/E11.9  · Essential hypertension     401.9/I10  · Hyperlipidemia     272.4/E78.5  · Sinusitis, acute     461.9/J01.90  · Eustachian tube dysfunction     381.81/H69.80      Plan  · Orders  o ACO-39: Current medications updated and reviewed (1159F, ) - - 12/16/2020  o ENT CONSULTATION ([facility]CO) - - 12/16/2020  · Medications  o Singulair 10 mg oral tablet   SIG: take 1 tablet (10 mg) by oral route once daily in the evening   DISP: (30) Tablet with 2 refills  Prescribed on 12/16/2020     o Augmentin 875-125 mg oral tablet   SIG: take 1 tablet by oral route every 12 hours for 10 days   DISP: (20) Tablet with 0 refills  Prescribed on 12/16/2020     o Medications have been Reconciled  o Transition of Care or Provider Policy  · Instructions  o Patient declines  breast cancer screening. Discussed risks associated with not having screening performed.   o Advised that cheeses and other sources of dairy fats, animal fats, fast food, and the extras (candy, pastries, pies, doughnuts and cookies) all contain LDL raising nutrients. Advised to increase fruits, vegetables, whole grains, and to monitor portion sizes.   o Patient was educated/instructed on their diagnosis, treatment and medications prior to discharge from the clinic today.  o Minutes spent with patient including greater than 50% in Education/Counseling/Care Coordination.  o Time spent with the patient was minutes, more than 50% face to face.  o Electronically Identified Patient Education Materials Provided Electronically  · Disposition  o Call or Return if symptoms worsen or persist.  o Follow up as scheduled  o Care Transition  o MOHAMUD Sent            Electronically Signed by: ABILIO Dickerson -Author on December 16, 2020 12:02:04 PM

## 2021-05-15 VITALS
OXYGEN SATURATION: 95 % | RESPIRATION RATE: 18 BRPM | TEMPERATURE: 97.9 F | HEIGHT: 66 IN | HEART RATE: 78 BPM | DIASTOLIC BLOOD PRESSURE: 78 MMHG | WEIGHT: 210 LBS | BODY MASS INDEX: 33.75 KG/M2 | SYSTOLIC BLOOD PRESSURE: 128 MMHG

## 2021-05-15 VITALS
RESPIRATION RATE: 18 BRPM | HEART RATE: 62 BPM | DIASTOLIC BLOOD PRESSURE: 74 MMHG | HEIGHT: 72 IN | SYSTOLIC BLOOD PRESSURE: 124 MMHG | OXYGEN SATURATION: 95 % | TEMPERATURE: 96.2 F

## 2021-05-16 VITALS
OXYGEN SATURATION: 96 % | RESPIRATION RATE: 16 BRPM | BODY MASS INDEX: 32.47 KG/M2 | DIASTOLIC BLOOD PRESSURE: 66 MMHG | WEIGHT: 202 LBS | HEIGHT: 66 IN | SYSTOLIC BLOOD PRESSURE: 112 MMHG | TEMPERATURE: 96.5 F | HEART RATE: 102 BPM

## 2021-05-16 VITALS
RESPIRATION RATE: 16 BRPM | HEIGHT: 66 IN | HEART RATE: 75 BPM | OXYGEN SATURATION: 98 % | BODY MASS INDEX: 33.91 KG/M2 | TEMPERATURE: 96 F | DIASTOLIC BLOOD PRESSURE: 70 MMHG | SYSTOLIC BLOOD PRESSURE: 133 MMHG | WEIGHT: 211 LBS

## 2021-05-16 VITALS
SYSTOLIC BLOOD PRESSURE: 126 MMHG | RESPIRATION RATE: 16 BRPM | OXYGEN SATURATION: 98 % | WEIGHT: 198.31 LBS | HEIGHT: 66 IN | DIASTOLIC BLOOD PRESSURE: 71 MMHG | BODY MASS INDEX: 31.87 KG/M2 | HEART RATE: 90 BPM | TEMPERATURE: 96.4 F

## 2021-08-12 NOTE — TELEPHONE ENCOUNTER
Caller: Clementina Rios    Relationship: Self    Best call back number: [phone]     Medication needed:   LORENA    When do you need the refill by: ASAP    Does the patient have less than a 3 day supply:  [] Yes  [x] No    What is the patient's preferred pharmacy: [facility] [place] - [address] [facility] - [phone] [facility] [phone]

## 2021-08-23 ENCOUNTER — LAB (OUTPATIENT)
Dept: LAB | Facility: HOSPITAL | Age: 56
End: 2021-08-23

## 2021-08-23 ENCOUNTER — OFFICE VISIT (OUTPATIENT)
Dept: OTOLARYNGOLOGY | Facility: CLINIC | Age: 56
End: 2021-08-23

## 2021-08-23 ENCOUNTER — PROCEDURE VISIT (OUTPATIENT)
Dept: OTOLARYNGOLOGY | Facility: CLINIC | Age: 56
End: 2021-08-23

## 2021-08-23 ENCOUNTER — TRANSCRIBE ORDERS (OUTPATIENT)
Dept: LAB | Facility: HOSPITAL | Age: 56
End: 2021-08-23

## 2021-08-23 VITALS — WEIGHT: 210.8 LBS | HEIGHT: 66 IN | BODY MASS INDEX: 33.88 KG/M2 | TEMPERATURE: 96.8 F

## 2021-08-23 DIAGNOSIS — H90.3 SENSORY HEARING LOSS, BILATERAL: ICD-10-CM

## 2021-08-23 DIAGNOSIS — H90.3 SENSORINEURAL HEARING LOSS (SNHL) OF BOTH EARS: Primary | ICD-10-CM

## 2021-08-23 DIAGNOSIS — E11.9 DIABETES MELLITUS WITHOUT COMPLICATION (HCC): ICD-10-CM

## 2021-08-23 DIAGNOSIS — R43.0 LOSS OF SMELL: ICD-10-CM

## 2021-08-23 DIAGNOSIS — H93.011: ICD-10-CM

## 2021-08-23 DIAGNOSIS — J31.0 CHRONIC RHINITIS: ICD-10-CM

## 2021-08-23 DIAGNOSIS — R43.2 LOSS OF TASTE: ICD-10-CM

## 2021-08-23 DIAGNOSIS — E11.9 DIABETES MELLITUS WITHOUT COMPLICATION (HCC): Primary | ICD-10-CM

## 2021-08-23 LAB
ALBUMIN SERPL-MCNC: 4.4 G/DL (ref 3.5–5.2)
ALBUMIN UR-MCNC: <1.2 MG/DL
ALBUMIN/GLOB SERPL: 1.8 G/DL
ALP SERPL-CCNC: 106 U/L (ref 39–117)
ALT SERPL W P-5'-P-CCNC: 13 U/L (ref 1–33)
ANION GAP SERPL CALCULATED.3IONS-SCNC: 9.5 MMOL/L (ref 5–15)
AST SERPL-CCNC: 19 U/L (ref 1–32)
BILIRUB SERPL-MCNC: 0.3 MG/DL (ref 0–1.2)
BUN SERPL-MCNC: 18 MG/DL (ref 6–20)
BUN/CREAT SERPL: 23.1 (ref 7–25)
CALCIUM SPEC-SCNC: 9.1 MG/DL (ref 8.6–10.5)
CHLORIDE SERPL-SCNC: 106 MMOL/L (ref 98–107)
CHOLEST SERPL-MCNC: 195 MG/DL (ref 0–200)
CO2 SERPL-SCNC: 24.5 MMOL/L (ref 22–29)
CREAT SERPL-MCNC: 0.78 MG/DL (ref 0.57–1)
GFR SERPL CREATININE-BSD FRML MDRD: 77 ML/MIN/1.73
GLOBULIN UR ELPH-MCNC: 2.5 GM/DL
GLUCOSE SERPL-MCNC: 111 MG/DL (ref 65–99)
HBA1C MFR BLD: 6.4 % (ref 4.8–5.6)
HDLC SERPL-MCNC: 43 MG/DL (ref 40–60)
LDLC SERPL CALC-MCNC: 129 MG/DL (ref 0–100)
LDLC/HDLC SERPL: 2.93 {RATIO}
POTASSIUM SERPL-SCNC: 4.3 MMOL/L (ref 3.5–5.2)
PROT SERPL-MCNC: 6.9 G/DL (ref 6–8.5)
SODIUM SERPL-SCNC: 140 MMOL/L (ref 136–145)
TRIGL SERPL-MCNC: 129 MG/DL (ref 0–150)
VLDLC SERPL-MCNC: 23 MG/DL (ref 5–40)

## 2021-08-23 PROCEDURE — 92567 TYMPANOMETRY: CPT | Performed by: AUDIOLOGIST

## 2021-08-23 PROCEDURE — 83036 HEMOGLOBIN GLYCOSYLATED A1C: CPT

## 2021-08-23 PROCEDURE — 92557 COMPREHENSIVE HEARING TEST: CPT | Performed by: AUDIOLOGIST

## 2021-08-23 PROCEDURE — 80061 LIPID PANEL: CPT

## 2021-08-23 PROCEDURE — 80053 COMPREHEN METABOLIC PANEL: CPT

## 2021-08-23 PROCEDURE — 36415 COLL VENOUS BLD VENIPUNCTURE: CPT

## 2021-08-23 PROCEDURE — 99213 OFFICE O/P EST LOW 20 MIN: CPT | Performed by: NURSE PRACTITIONER

## 2021-08-23 PROCEDURE — 82043 UR ALBUMIN QUANTITATIVE: CPT

## 2021-08-23 RX ORDER — METFORMIN HYDROCHLORIDE 500 MG/1
1000 TABLET, EXTENDED RELEASE ORAL
COMMUNITY
Start: 2021-03-05 | End: 2021-11-05 | Stop reason: SDUPTHER

## 2021-08-23 RX ORDER — ESZOPICLONE 3 MG/1
3 TABLET, FILM COATED ORAL NIGHTLY
COMMUNITY
Start: 2021-06-01 | End: 2022-09-30 | Stop reason: SDUPTHER

## 2021-08-23 RX ORDER — MONTELUKAST SODIUM 10 MG/1
10 TABLET ORAL NIGHTLY
COMMUNITY
End: 2022-01-14

## 2021-08-23 RX ORDER — FLUTICASONE PROPIONATE 50 MCG
2 SPRAY, SUSPENSION (ML) NASAL DAILY PRN
COMMUNITY
End: 2021-08-27 | Stop reason: SDUPTHER

## 2021-08-23 NOTE — PROGRESS NOTES
Patient Name: Clementina Rios   Visit Date: 08/23/2021   Patient ID: [id number]  Provider: ABILIO Jimenez    Sex: female  [place] [facility] [place] [facility]   YOB: 1965  Location Address: [address] [place] [address], [address],?[place]    Primary Care Provider Daniel Mares APRN  Location Phone: [phone]    Referring Provider: No ref. provider found        Chief Complaint  Hearing Loss and Follow-up    Subjective          Clementina Rios is a 56 y.o. female who presents to [facility] for a follow-up visit of hearing loss, chronic rhinitis and loss of sense of taste and smell.  I last saw her on 1/7/2021 at which time I asked her to return in 1 month for an audiogram.  She states since that time she has moved from an area where her house was surrounded by trees to a new condo with very little trees.  She states that this has not made much difference in her symptoms.  She still feels like she cannot taste and smell.  She states there are certain food she can taste such as hot and sour foods but otherwise she has poor taste.  She also states that she has lost most of her smell.  She states that this happened greater than 2 years ago after a dental procedure.  She states she has chronic clear rhinitis draining out of the front of her nose.  She has been using azelastine and fluticasone intermittently but not using both of them consistently.  She is using nasal rinses which she does think has helped with her rhinitis.  She states that she feels like her hearing is worse on the right side and most of her ear symptoms occur on the right side.      Current Outpatient Medications on File Prior to Visit   Medication Sig   • empagliflozin (Jardiance) 25 MG tablet tablet Take 1 tablet by mouth Daily.   • eszopiclone (LUNESTA) 3 MG tablet    • fluticasone (FLONASE) 50 MCG/ACT nasal spray 2 sprays into the nostril(s) as directed by provider Daily As  "Needed for Rhinitis.   • metFORMIN ER (GLUCOPHAGE-XR) 500 MG 24 hr tablet metformin 500 mg oral tablet extended release 24 hr TAKE ONE TABLET BY MOUTH DAILY 3/5/2021  Active   • montelukast (SINGULAIR) 10 MG tablet Take 10 mg by mouth Every Night.   • sertraline (Zoloft) 50 MG tablet Zoloft 50 mg oral tablet take 1 tablet (50 mg) by oral route once daily   Active     No current facility-administered medications on file prior to visit.        Social History     Tobacco Use   • Smoking status: Never Smoker   • Smokeless tobacco: Never Used   Vaping Use   • Vaping Use: Never used   Substance Use Topics   • Alcohol use: Never   • Drug use: Not on file        Objective     Vital Signs:   Temp 96.8 °F (36 °C) (Temporal)   Ht 167.6 cm (66\")   Wt 95.6 kg (210 lb 12.8 oz)   BMI 34.02 kg/m²       Physical Exam  Constitutional:       General: She is not in acute distress.     Appearance: Normal appearance. She is not ill-appearing.   HENT:      Head: Normocephalic and atraumatic.      Jaw: There is normal jaw occlusion. No tenderness or pain on movement.      Salivary Glands: Right salivary gland is not diffusely enlarged or tender. Left salivary gland is not diffusely enlarged or tender.      Right Ear: Tympanic membrane, ear canal and external ear normal.      Left Ear: Tympanic membrane, ear canal and external ear normal.      Nose: Nose normal. No septal deviation.      Right Sinus: No maxillary sinus tenderness or frontal sinus tenderness.      Left Sinus: No maxillary sinus tenderness or frontal sinus tenderness.      Mouth/Throat:      Lips: Pink. No lesions.      Mouth: Mucous membranes are moist. No oral lesions.      Dentition: Normal dentition.      Tongue: No lesions.      Palate: No mass and lesions.      Pharynx: Oropharynx is clear. Uvula midline.      Tonsils: No tonsillar exudate.   Eyes:      Extraocular Movements: Extraocular movements intact.      Conjunctiva/sclera: Conjunctivae normal.      Pupils: " Pupils are equal, round, and reactive to light.   Neck:      Thyroid: No thyroid mass, thyromegaly or thyroid tenderness.      Trachea: Trachea normal.   Pulmonary:      Effort: Pulmonary effort is normal. No respiratory distress.   Musculoskeletal:         General: Normal range of motion.      Cervical back: Normal range of motion and neck supple. No tenderness.   Lymphadenopathy:      Cervical: No cervical adenopathy.   Skin:     General: Skin is warm and dry.   Neurological:      General: No focal deficit present.      Mental Status: She is alert and oriented to person, place, and time.      Cranial Nerves: No cranial nerve deficit.      Motor: No weakness.      Gait: Gait normal.   Psychiatric:         Mood and Affect: Mood normal.         Behavior: Behavior normal.         Thought Content: Thought content normal.         Judgment: Judgment normal.                Result Review :               Assessment and Plan    Diagnoses and all orders for this visit:    1. Sensorineural hearing loss (SNHL) of both ears (Primary)    2. Chronic rhinitis  -     CT Maxillofacial Without Contrast; Future    3. Loss of smell  -     CT Maxillofacial Without Contrast; Future    4. Loss of taste  -     CT Maxillofacial Without Contrast; Future    Audiogram and tympanogram testing was done today.  The audiogram shows the right ear with a mild to moderate sensorineural hearing loss from 2000 through 4000 Hz.  The left ear has essentially normal hearing with a mild sensorineural hearing loss from 3000 through 4000 Hz.  Speech reception thresholds at 15 dB bilaterally.  Word discrimination scores 100% bilaterally at 60 dB.  Tympanograms were normal bilaterally without any evidence of middle ear fluid, infection or positive or negative pressure today.  I gone over the results of the audiogram with the patient and also given her copy.  She is very bothered by her rhinitis and loss of sense of taste and smell.  We will order a CT scan of  her sinuses to rule out any chronic sinusitis or polyps that could be causing the symptoms.  I encouraged her to use her fluticasone and azelastine nasal spray daily and also her nasal rinses.  We've discussed if her CT scan is normal we might consider a referral to an allergist for allergy testing and possible immunotherapy.       Follow Up   Return in about 4 weeks (around 9/20/2021), or after CT scan.  Patient was given instructions and counseling regarding her condition or for health maintenance advice. Please see specific information pulled into the AVS if appropriate.     ABILIO Jimenez

## 2021-08-31 ENCOUNTER — TELEPHONE (OUTPATIENT)
Dept: FAMILY MEDICINE CLINIC | Facility: CLINIC | Age: 56
End: 2021-08-31

## 2021-09-01 RX ORDER — FLUTICASONE PROPIONATE 50 MCG
2 SPRAY, SUSPENSION (ML) NASAL DAILY PRN
Qty: 47.4 ML | Refills: 3 | Status: SHIPPED | OUTPATIENT
Start: 2021-09-01 | End: 2021-11-30

## 2021-09-07 ENCOUNTER — HOSPITAL ENCOUNTER (OUTPATIENT)
Dept: CT IMAGING | Facility: HOSPITAL | Age: 56
Discharge: HOME | End: 2021-09-07
Admitting: NURSE PRACTITIONER

## 2021-09-07 DIAGNOSIS — R43.2 LOSS OF TASTE: ICD-10-CM

## 2021-09-07 DIAGNOSIS — R43.0 LOSS OF SMELL: ICD-10-CM

## 2021-09-07 DIAGNOSIS — J31.0 CHRONIC RHINITIS: ICD-10-CM

## 2021-09-07 PROCEDURE — 70486 CT MAXILLOFACIAL W/O DYE: CPT

## 2021-09-10 ENCOUNTER — OFFICE VISIT (OUTPATIENT)
Dept: FAMILY MEDICINE CLINIC | Facility: CLINIC | Age: 56
End: 2021-09-10

## 2021-09-10 VITALS
OXYGEN SATURATION: 98 % | WEIGHT: 207 LBS | DIASTOLIC BLOOD PRESSURE: 80 MMHG | HEIGHT: 66 IN | HEART RATE: 86 BPM | TEMPERATURE: 97.4 F | SYSTOLIC BLOOD PRESSURE: 122 MMHG | BODY MASS INDEX: 33.27 KG/M2

## 2021-09-10 DIAGNOSIS — R00.2 PALPITATIONS: ICD-10-CM

## 2021-09-10 DIAGNOSIS — E11.9 TYPE 2 DIABETES MELLITUS WITHOUT COMPLICATION, WITHOUT LONG-TERM CURRENT USE OF INSULIN (HCC): Primary | ICD-10-CM

## 2021-09-10 DIAGNOSIS — M79.605 PAIN IN BOTH LOWER EXTREMITIES: ICD-10-CM

## 2021-09-10 DIAGNOSIS — M79.604 PAIN IN BOTH LOWER EXTREMITIES: ICD-10-CM

## 2021-09-10 DIAGNOSIS — G62.9 NEUROPATHY: ICD-10-CM

## 2021-09-10 DIAGNOSIS — J30.9 ALLERGIC RHINITIS, UNSPECIFIED SEASONALITY, UNSPECIFIED TRIGGER: ICD-10-CM

## 2021-09-10 PROCEDURE — 99213 OFFICE O/P EST LOW 20 MIN: CPT | Performed by: NURSE PRACTITIONER

## 2021-09-10 PROCEDURE — 93000 ELECTROCARDIOGRAM COMPLETE: CPT | Performed by: NURSE PRACTITIONER

## 2021-09-10 RX ORDER — AZELASTINE HCL 205.5 UG/1
2 SPRAY NASAL 2 TIMES DAILY
Qty: 10 ML | Refills: 3 | Status: SHIPPED | OUTPATIENT
Start: 2021-09-10 | End: 2022-09-29 | Stop reason: ALTCHOICE

## 2021-09-10 RX ORDER — AMITRIPTYLINE HYDROCHLORIDE 25 MG/1
25 TABLET, FILM COATED ORAL NIGHTLY
Qty: 90 TABLET | Refills: 0 | Status: SHIPPED | OUTPATIENT
Start: 2021-09-10 | End: 2022-07-05

## 2021-09-10 NOTE — PROGRESS NOTES
"Chief Complaint  Shoulder Pain and Leg Pain (Pain and heavness with cramps)    Subjective          Clementina Rios presents to [facility]  Patient presents to the office today with complaints of bilateral lower leg pain.  She states that the pain worsens after working all day long having to stand on hard surface.  Patient also does state that the pain feels as if the pins-and-needles.  Patient does have a history of diabetes.  Patient does state that she also has frequent palpitations that will wake her up in melanite.  She states that the pain will radiate to her shoulder.  He does have a strong family history of cardiovascular disease.  Her mother did have a aneurysm at the age of 53.  Patient states that her father did have cardiovascular disease as well.  Patient states that she did discuss the palpitations and the anxiety-like symptoms to her therapist which she was started on buspirone.  The symptoms never did change after being on that medication therefore she stopped.  I did discuss referring patient to a cardiologist for a evaluation due to her strong family history.    Leg Pain         Objective   Vital Signs:   /80   Pulse 86   Temp 97.4 °F (36.3 °C)   Ht 167.6 cm (66\")   Wt 93.9 kg (207 lb)   SpO2 98%   BMI 33.41 kg/m²     Physical Exam  Vitals reviewed.   Constitutional:       Appearance: Normal appearance.   Cardiovascular:      Rate and Rhythm: Normal rate and regular rhythm.      Heart sounds: Normal heart sounds, S1 normal and S2 normal. No murmur heard.     Pulmonary:      Effort: Pulmonary effort is normal. No respiratory distress.      Breath sounds: Normal breath sounds.   Skin:     General: Skin is warm and dry.   Neurological:      Mental Status: She is alert and oriented to person, place, and time.   Psychiatric:         Attention and Perception: Attention normal.         Mood and Affect: Mood normal.         Behavior: Behavior normal.      "   Result Review :           ECG 12 Lead    Date/Time: 9/10/2021 12:33 PM  Performed by: Daniel Mares APRN  Authorized by: Daniel Mares APRN   Comparison: not compared with previous ECG   Rhythm: sinus rhythm  Rate: normal    Clinical impression: normal ECG              Assessment and Plan    Diagnoses and all orders for this visit:    1. Type 2 diabetes mellitus without complication, without long-term current use of insulin (CMS/[facility]) (Primary)  -     empagliflozin (Jardiance) 25 MG tablet tablet; Take 1 tablet by mouth Daily.  Dispense: 90 tablet; Refill: 1    2. Palpitations  -     Ambulatory Referral to Cardiology  -     ECG 12 Lead    3. Pain in both lower extremities  -     amitriptyline (ELAVIL) 25 MG tablet; Take 1 tablet by mouth Every Night.  Dispense: 90 tablet; Refill: 0    4. Neuropathy  -     amitriptyline (ELAVIL) 25 MG tablet; Take 1 tablet by mouth Every Night.  Dispense: 90 tablet; Refill: 0    5. Allergic rhinitis, unspecified seasonality, unspecified trigger  -     azelastine (ASTEPRO) 0.15 % solution nasal spray; 2 sprays into the nostril(s) as directed by provider 2 (Two) Times a Day.  Dispense: 10 mL; Refill: 3        Follow Up   Return if symptoms worsen or fail to improve.  Patient was given instructions and counseling regarding her condition or for health maintenance advice. Please see specific information pulled into the AVS if appropriate.

## 2021-09-17 ENCOUNTER — TELEPHONE (OUTPATIENT)
Dept: FAMILY MEDICINE CLINIC | Facility: CLINIC | Age: 56
End: 2021-09-17

## 2021-09-17 DIAGNOSIS — I73.9 CLAUDICATION (HCC): Primary | ICD-10-CM

## 2021-09-17 RX ORDER — CILOSTAZOL 100 MG/1
100 TABLET ORAL 2 TIMES DAILY
Qty: 60 TABLET | Refills: 0 | Status: SHIPPED | OUTPATIENT
Start: 2021-09-17 | End: 2021-11-05 | Stop reason: SDUPTHER

## 2021-09-17 NOTE — TELEPHONE ENCOUNTER
Called pt to let her know about order she wants to know what she can due for the pain due to it hurts to walk and she is unable to work.

## 2021-09-17 NOTE — TELEPHONE ENCOUNTER
Patient left a message that stated she is still having pain in her legs and would like to know what else you can do to help her with this.

## 2021-09-21 DIAGNOSIS — I73.9 CLAUDICATION OF BOTH LOWER EXTREMITIES (HCC): Primary | ICD-10-CM

## 2021-09-23 ENCOUNTER — HOSPITAL ENCOUNTER (OUTPATIENT)
Dept: CARDIOLOGY | Facility: HOSPITAL | Age: 56
Discharge: HOME | End: 2021-09-23
Admitting: NURSE PRACTITIONER

## 2021-09-23 DIAGNOSIS — I73.9 CLAUDICATION OF BOTH LOWER EXTREMITIES (HCC): ICD-10-CM

## 2021-09-23 LAB
BH CV LOWER VASCULAR LEFT COMMON FEMORAL AUGMENT: NORMAL
BH CV LOWER VASCULAR LEFT COMMON FEMORAL COMPETENT: NORMAL
BH CV LOWER VASCULAR LEFT COMMON FEMORAL COMPRESS: NORMAL
BH CV LOWER VASCULAR LEFT COMMON FEMORAL PHASIC: NORMAL
BH CV LOWER VASCULAR LEFT COMMON FEMORAL SPONT: NORMAL
BH CV LOWER VASCULAR LEFT DISTAL FEMORAL COMPRESS: NORMAL
BH CV LOWER VASCULAR LEFT GREATER SAPH AK COMPRESS: NORMAL
BH CV LOWER VASCULAR LEFT MID FEMORAL AUGMENT: NORMAL
BH CV LOWER VASCULAR LEFT MID FEMORAL COMPETENT: NORMAL
BH CV LOWER VASCULAR LEFT MID FEMORAL COMPRESS: NORMAL
BH CV LOWER VASCULAR LEFT MID FEMORAL PHASIC: NORMAL
BH CV LOWER VASCULAR LEFT MID FEMORAL SPONT: NORMAL
BH CV LOWER VASCULAR LEFT PERONEAL COMPRESS: NORMAL
BH CV LOWER VASCULAR LEFT POPLITEAL AUGMENT: NORMAL
BH CV LOWER VASCULAR LEFT POPLITEAL COMPETENT: NORMAL
BH CV LOWER VASCULAR LEFT POPLITEAL COMPRESS: NORMAL
BH CV LOWER VASCULAR LEFT POPLITEAL PHASIC: NORMAL
BH CV LOWER VASCULAR LEFT POPLITEAL SPONT: NORMAL
BH CV LOWER VASCULAR LEFT POSTERIOR TIBIAL COMPRESS: NORMAL
BH CV LOWER VASCULAR LEFT PROXIMAL FEMORAL COMPRESS: NORMAL
BH CV LOWER VASCULAR LEFT SAPHENOFEMORAL JUNCTION COMPRESS: NORMAL
BH CV LOWER VASCULAR RIGHT COMMON FEMORAL AUGMENT: NORMAL
BH CV LOWER VASCULAR RIGHT COMMON FEMORAL COMPETENT: NORMAL
BH CV LOWER VASCULAR RIGHT COMMON FEMORAL COMPRESS: NORMAL
BH CV LOWER VASCULAR RIGHT COMMON FEMORAL PHASIC: NORMAL
BH CV LOWER VASCULAR RIGHT COMMON FEMORAL SPONT: NORMAL
BH CV LOWER VASCULAR RIGHT DISTAL FEMORAL COMPRESS: NORMAL
BH CV LOWER VASCULAR RIGHT GREATER SAPH AK COMPRESS: NORMAL
BH CV LOWER VASCULAR RIGHT MID FEMORAL AUGMENT: NORMAL
BH CV LOWER VASCULAR RIGHT MID FEMORAL COMPETENT: NORMAL
BH CV LOWER VASCULAR RIGHT MID FEMORAL COMPRESS: NORMAL
BH CV LOWER VASCULAR RIGHT MID FEMORAL PHASIC: NORMAL
BH CV LOWER VASCULAR RIGHT MID FEMORAL SPONT: NORMAL
BH CV LOWER VASCULAR RIGHT PERONEAL COMPRESS: NORMAL
BH CV LOWER VASCULAR RIGHT POPLITEAL AUGMENT: NORMAL
BH CV LOWER VASCULAR RIGHT POPLITEAL COMPETENT: NORMAL
BH CV LOWER VASCULAR RIGHT POPLITEAL COMPRESS: NORMAL
BH CV LOWER VASCULAR RIGHT POPLITEAL PHASIC: NORMAL
BH CV LOWER VASCULAR RIGHT POPLITEAL SPONT: NORMAL
BH CV LOWER VASCULAR RIGHT POSTERIOR TIBIAL COMPRESS: NORMAL
BH CV LOWER VASCULAR RIGHT PROXIMAL FEMORAL COMPRESS: NORMAL
BH CV LOWER VASCULAR RIGHT SAPHENOFEMORAL JUNCTION AUGMENT: NORMAL
BH CV LOWER VASCULAR RIGHT SAPHENOFEMORAL JUNCTION COMPETENT: NORMAL
BH CV LOWER VASCULAR RIGHT SAPHENOFEMORAL JUNCTION COMPRESS: NORMAL
BH CV LOWER VASCULAR RIGHT SAPHENOFEMORAL JUNCTION PHASIC: NORMAL
BH CV LOWER VASCULAR RIGHT SAPHENOFEMORAL JUNCTION SPONT: NORMAL
MAXIMAL PREDICTED HEART RATE: 166 BPM
STRESS TARGET HR: 141 BPM

## 2021-09-23 PROCEDURE — 93970 EXTREMITY STUDY: CPT | Performed by: SURGERY

## 2021-09-23 PROCEDURE — 93970 EXTREMITY STUDY: CPT

## 2021-09-27 ENCOUNTER — APPOINTMENT (OUTPATIENT)
Dept: CARDIOLOGY | Facility: HOSPITAL | Age: 56
End: 2021-09-27

## 2021-09-28 ENCOUNTER — TELEPHONE (OUTPATIENT)
Dept: FAMILY MEDICINE CLINIC | Facility: CLINIC | Age: 56
End: 2021-09-28

## 2021-10-01 RX ORDER — GABAPENTIN 300 MG/1
300 CAPSULE ORAL 3 TIMES DAILY
Qty: 90 CAPSULE | Refills: 0 | Status: SHIPPED | OUTPATIENT
Start: 2021-10-01 | End: 2021-11-15

## 2021-10-01 NOTE — TELEPHONE ENCOUNTER
Spoke with patient she said that she saw Daniel on 9/10/21 for her leg pain. She stated that she is still having pain and she can not take the Amitriptyline due to it is on her generic testing paper not to take. She has not been taking anything. She stated that she did the Leg US and received the results. She would like to know if she could try Gabapentin she has never been on it before but did research and believes this is what she needs. She does of RLS. She can not take Hydrocodone and can not tolerate pain medications. She uses [facility] on [address].

## 2021-10-04 NOTE — TELEPHONE ENCOUNTER
SPOKE WITH PATIENT WHO STATED SHE HAS NOT SEE THAT IT HAS HELPED MUCH BUT SHE WILL GIVE IT A FEW WEEKS

## 2021-11-05 DIAGNOSIS — I73.9 CLAUDICATION (HCC): ICD-10-CM

## 2021-11-05 RX ORDER — CILOSTAZOL 100 MG/1
100 TABLET ORAL 2 TIMES DAILY
Qty: 180 TABLET | Refills: 1 | Status: SHIPPED | OUTPATIENT
Start: 2021-11-05 | End: 2022-01-28 | Stop reason: SDUPTHER

## 2021-11-05 RX ORDER — METFORMIN HYDROCHLORIDE 500 MG/1
1000 TABLET, EXTENDED RELEASE ORAL
Qty: 180 TABLET | Refills: 1 | Status: SHIPPED | OUTPATIENT
Start: 2021-11-05 | End: 2022-05-03

## 2021-11-05 NOTE — TELEPHONE ENCOUNTER
Caller: Clementina Rios    Relationship: Self      Requested Prescriptions:   Requested Prescriptions     Pending Prescriptions Disp Refills   • metFORMIN ER (GLUCOPHAGE-XR) 500 MG 24 hr tablet       Si tablets.   • cilostazol (PLETAL) 100 MG tablet 60 tablet 0     Sig: Take 1 tablet by mouth 2 (Two) Times a Day.        Pharmacy where request should be sent:   [facility] [place] [facility] -  [place], [place] - [address] - [phone] [facility] [phone]   [phone]    Additional details provided by patient: REQUESTING 90 DAY SUPPLY. ONE WEEK LEFT ON BOTH SCRIPTS.     Best call back number: [phone]     Does the patient have less than a 3 day supply:  [] Yes  [x] No    Trish Bynum Rep   21 11:10 EDT            [facility]

## 2021-11-10 RX ORDER — EZETIMIBE 10 MG/1
TABLET ORAL
Qty: 90 TABLET | Refills: 1 | Status: SHIPPED | OUTPATIENT
Start: 2021-11-10 | End: 2022-05-19

## 2021-11-15 RX ORDER — GABAPENTIN 300 MG/1
CAPSULE ORAL
Qty: 90 CAPSULE | Refills: 2 | Status: SHIPPED | OUTPATIENT
Start: 2021-11-15 | End: 2021-11-16 | Stop reason: SDUPTHER

## 2021-11-16 RX ORDER — GABAPENTIN 300 MG/1
300 CAPSULE ORAL 3 TIMES DAILY
Qty: 270 CAPSULE | Refills: 1 | Status: SHIPPED | OUTPATIENT
Start: 2021-11-16 | End: 2022-01-05 | Stop reason: SDUPTHER

## 2022-01-05 DIAGNOSIS — E11.9 TYPE 2 DIABETES MELLITUS WITHOUT COMPLICATION, WITHOUT LONG-TERM CURRENT USE OF INSULIN: ICD-10-CM

## 2022-01-05 RX ORDER — GABAPENTIN 300 MG/1
300 CAPSULE ORAL 3 TIMES DAILY
Qty: 270 CAPSULE | Refills: 1 | Status: SHIPPED | OUTPATIENT
Start: 2022-01-05 | End: 2022-07-05 | Stop reason: SDUPTHER

## 2022-01-05 NOTE — TELEPHONE ENCOUNTER
Caller: Clementina Rios RODOLFO    Relationship: Self    Best call back number: [phone]     Requested Prescriptions:   Requested Prescriptions     Pending Prescriptions Disp Refills   • gabapentin (NEURONTIN) 300 MG capsule 270 capsule 1     Sig: Take 1 capsule by mouth 3 (Three) Times a Day.   • empagliflozin (Jardiance) 25 MG tablet tablet 90 tablet 1     Sig: Take 1 tablet by mouth Daily.        Pharmacy where request should be sent: [facility] [place], [address] [phone] [facility] [phone]      Additional details provided by patient: PATIENT REQUESTS 90 DAY SUPPLY; PATIENT REQUEST CALLBACK WHEN PRESCRIPTIONS HAVE BEEN SENT.       Does the patient have less than a 3 day supply:  [] Yes  [x] No    Trish Tucker Rep   01/05/22 15:57 EST

## 2022-01-14 RX ORDER — MONTELUKAST SODIUM 10 MG/1
TABLET ORAL
Qty: 30 TABLET | Refills: 3 | Status: SHIPPED | OUTPATIENT
Start: 2022-01-14 | End: 2022-05-20

## 2022-01-28 DIAGNOSIS — I73.9 CLAUDICATION: ICD-10-CM

## 2022-01-28 RX ORDER — CILOSTAZOL 100 MG/1
100 TABLET ORAL 2 TIMES DAILY
Qty: 180 TABLET | Refills: 1 | Status: SHIPPED | OUTPATIENT
Start: 2022-01-28 | End: 2022-07-13 | Stop reason: SDUPTHER

## 2022-01-28 NOTE — TELEPHONE ENCOUNTER
Caller: Clementina Rios RODOLFO    Relationship: Self    Best call back number: [phone]    Requested Prescriptions:   Requested Prescriptions     Pending Prescriptions Disp Refills   • cilostazol (PLETAL) 100 MG tablet 180 tablet 1     Sig: Take 1 tablet by mouth 2 (Two) Times a Day.        Pharm[facility] where request should be sent[facility]  [place] [place]  [address] [phone] [facility] [phone] FX     Additional details provided by patient: PATIENT HAS MORE THAN 3 DAYS LEFT. PATIENT IS REQUESTING 90 DAY SUPPLY. PLEASE CALL WHEN ORDERS ARE PLACED.     Does the patient have less than a 3 day supply:  [] Yes  [x] No    Trish Arvizu Rep   01/28/22 10:04 EST

## 2022-05-03 RX ORDER — METFORMIN HYDROCHLORIDE 500 MG/1
TABLET, EXTENDED RELEASE ORAL
Qty: 30 TABLET | Refills: 2 | Status: SHIPPED | OUTPATIENT
Start: 2022-05-03 | End: 2022-06-22 | Stop reason: SDUPTHER

## 2022-05-11 ENCOUNTER — HOSPITAL ENCOUNTER (OUTPATIENT)
Dept: GENERAL RADIOLOGY | Facility: HOSPITAL | Age: 57
Discharge: HOME | End: 2022-05-11
Admitting: STUDENT IN AN ORGANIZED HEALTH CARE EDUCATION/TRAINING PROGRAM

## 2022-05-11 ENCOUNTER — OFFICE VISIT (OUTPATIENT)
Dept: FAMILY MEDICINE CLINIC | Facility: CLINIC | Age: 57
End: 2022-05-11

## 2022-05-11 VITALS
HEART RATE: 100 BPM | BODY MASS INDEX: 32.99 KG/M2 | OXYGEN SATURATION: 98 % | WEIGHT: 205.3 LBS | RESPIRATION RATE: 18 BRPM | SYSTOLIC BLOOD PRESSURE: 120 MMHG | TEMPERATURE: 98.2 F | DIASTOLIC BLOOD PRESSURE: 74 MMHG | HEIGHT: 66 IN

## 2022-05-11 DIAGNOSIS — J02.9 ACUTE PHARYNGITIS, UNSPECIFIED ETIOLOGY: ICD-10-CM

## 2022-05-11 DIAGNOSIS — J06.9 UPPER RESPIRATORY TRACT INFECTION, UNSPECIFIED TYPE: Primary | ICD-10-CM

## 2022-05-11 DIAGNOSIS — J06.9 UPPER RESPIRATORY TRACT INFECTION, UNSPECIFIED TYPE: ICD-10-CM

## 2022-05-11 LAB
EXPIRATION DATE: NORMAL
EXPIRATION DATE: NORMAL
FLUAV AG UPPER RESP QL IA.RAPID: NOT DETECTED
FLUBV AG UPPER RESP QL IA.RAPID: NOT DETECTED
INTERNAL CONTROL: NORMAL
INTERNAL CONTROL: NORMAL
Lab: NORMAL
Lab: NORMAL
S PYO AG THROAT QL: NEGATIVE
SARS-COV-2 AG UPPER RESP QL IA.RAPID: NOT DETECTED

## 2022-05-11 PROCEDURE — 99213 OFFICE O/P EST LOW 20 MIN: CPT | Performed by: STUDENT IN AN ORGANIZED HEALTH CARE EDUCATION/TRAINING PROGRAM

## 2022-05-11 PROCEDURE — 71046 X-RAY EXAM CHEST 2 VIEWS: CPT

## 2022-05-11 PROCEDURE — 87428 SARSCOV & INF VIR A&B AG IA: CPT | Performed by: STUDENT IN AN ORGANIZED HEALTH CARE EDUCATION/TRAINING PROGRAM

## 2022-05-11 PROCEDURE — 87880 STREP A ASSAY W/OPTIC: CPT | Performed by: STUDENT IN AN ORGANIZED HEALTH CARE EDUCATION/TRAINING PROGRAM

## 2022-05-11 RX ORDER — AZITHROMYCIN 250 MG/1
TABLET, FILM COATED ORAL
Qty: 6 TABLET | Refills: 0 | Status: SHIPPED | OUTPATIENT
Start: 2022-05-11 | End: 2022-06-29

## 2022-05-11 RX ORDER — PREDNISONE 20 MG/1
20 TABLET ORAL DAILY
Qty: 5 TABLET | Refills: 0 | Status: SHIPPED | OUTPATIENT
Start: 2022-05-11 | End: 2022-06-29

## 2022-05-11 NOTE — PROGRESS NOTES
"Chief Complaint  Chest congestion and sore throat    Subjective         Clementina Rios is a 57 y.o. female who presents to [facility]  57 years old female comes to the clinic today for an acute visit.    Patient is complaining of few weeks history of upper respiratory tract congestion, sore throat.  Denies any fever/chest pain/shortness of breath.  Patient reports 2 weeks ago she was seen by dental clinic and was diagnosed with fluid in the ear but since then symptoms has gotten worse.  Patient did not have any medications or any chest x-ray.    Patient reports no sick contact or recent travel.  Denies any GI symptoms/sick contact.    Review of Systems   Objective   Vital Signs:   Vitals:    05/11/22 1303   BP: 120/74   Pulse: 100   Resp: 18   Temp: 98.2 °F (36.8 °C)   SpO2: 98%   Weight: 93.1 kg (205 lb 4.8 oz)   Height: 167.6 cm (66\")      Body mass index is 33.14 kg/m².   Physical Exam  HENT:      Right Ear: Tympanic membrane is erythematous. Tympanic membrane has decreased mobility.      Left Ear: Tympanic membrane has decreased mobility.      Mouth/Throat:      Pharynx: Pharyngeal swelling and posterior oropharyngeal erythema present. No oropharyngeal exudate or uvula swelling.   Pulmonary:      Effort: Pulmonary effort is normal.      Breath sounds: Normal breath sounds.                       Assessment and Plan   Diagnoses and all orders for this visit:    1. Upper respiratory tract infection, unspecified type (Primary)  -     POCT SARS-CoV-2 Antigen ZARIA + Flu  -     POCT rapid strep A  -     XR Chest PA & Lateral; Future  -     azithromycin (Zithromax Z-Antoine) 250 MG tablet; Take 2 tablets by mouth on day 1, then 1 tablet daily on days 2-5  Dispense: 6 tablet; Refill: 0  -     predniSONE (DELTASONE) 20 MG tablet; Take 1 tablet by mouth Daily.  Dispense: 5 tablet; Refill: 0    2. Acute pharyngitis, unspecified etiology  -     XR Chest PA & Lateral; Future  -     " azithromycin (Zithromax Z-Antoine) 250 MG tablet; Take 2 tablets by mouth on day 1, then 1 tablet daily on days 2-5  Dispense: 6 tablet; Refill: 0  -     predniSONE (DELTASONE) 20 MG tablet; Take 1 tablet by mouth Daily.  Dispense: 5 tablet; Refill: 0    After reviewing patient's symptoms and physical examination, we will empirically treat patient with Z-Antoine/prednisone.  Chest x-ray pending.    Negative flu/COVID/strep in the clinic.    Patient to call me if no improvement in next 2 to 3 days or any worsening.      Follow Up   No follow-ups on file.  Patient was given instructions and counseling regarding her condition or for health maintenance advice. Please see specific information pulled into the AVS if appropriate.

## 2022-05-19 RX ORDER — EZETIMIBE 10 MG/1
TABLET ORAL
Qty: 90 TABLET | Refills: 1 | Status: SHIPPED | OUTPATIENT
Start: 2022-05-19 | End: 2022-09-29 | Stop reason: SDUPTHER

## 2022-05-20 RX ORDER — MONTELUKAST SODIUM 10 MG/1
TABLET ORAL
Qty: 30 TABLET | Refills: 3 | Status: SHIPPED | OUTPATIENT
Start: 2022-05-20 | End: 2022-09-26

## 2022-05-26 ENCOUNTER — TELEPHONE (OUTPATIENT)
Dept: FAMILY MEDICINE CLINIC | Facility: CLINIC | Age: 57
End: 2022-05-26

## 2022-05-26 DIAGNOSIS — J06.9 ACUTE URI: Primary | ICD-10-CM

## 2022-06-22 RX ORDER — METFORMIN HYDROCHLORIDE 500 MG/1
500 TABLET, EXTENDED RELEASE ORAL DAILY
Qty: 30 TABLET | Refills: 2 | Status: SHIPPED | OUTPATIENT
Start: 2022-06-22 | End: 2022-06-23 | Stop reason: SDUPTHER

## 2022-06-22 NOTE — TELEPHONE ENCOUNTER
Caller: Clementina Rios RODOLFO    Relationship: Self    Best call back number: [phone]     Requested Prescriptions:   Requested Prescriptions     Pending Prescriptions Disp Refills   • metFORMIN ER (GLUCOPHAGE-XR) 500 MG 24 hr tablet 30 tablet 2     Sig: Take 1 tablet by mouth Daily.        Pharmacy where request should be sent: [facility] [place] [address] [facility] [phone] [facility] [phone] FX     Additional details provided by patient: PATIENT STATES SHE TAKE 2 TABLETS A DAY NOT ONE. PLEASE ADJUST PRESCRIPTION    Does the patient have less than a 3 day supply:  [x] Yes  [] No    Trish ANDRES Rep   06/22/22 12:28 EDT

## 2022-06-23 NOTE — TELEPHONE ENCOUNTER
Caller: Blanca, Karen RODOLFO    Relationship: Self    Best call back number: [phone]     Requested Prescriptions:   Requested Prescriptions     Pending Prescriptions Disp Refills   • metFORMIN ER (GLUCOPHAGE-XR) 500 MG 24 hr tablet 30 tablet 2     Sig: Take 1 tablet by mouth Daily.        Pharmacy where request should be sent: [facility] [facility] [phone] [facility] [phone] FX     Additional details provided by patient: PATIENT STATED WASN'T ABLE TO  REFILL BECAUSE PRESCRIPTION STATED ONLY A DAY. PATIENT STATES SHE IS TAKING TWO A DAY.    PLEASE ADVISE     Does the patient have less than a 3 day supply:  [x] Yes  [] No    Trish Key Rep   06/23/22 14:12 EDT

## 2022-06-24 RX ORDER — METFORMIN HYDROCHLORIDE 500 MG/1
500 TABLET, EXTENDED RELEASE ORAL DAILY
Qty: 30 TABLET | Refills: 2 | Status: SHIPPED | OUTPATIENT
Start: 2022-06-24 | End: 2022-07-05

## 2022-06-28 DIAGNOSIS — E11.9 TYPE 2 DIABETES MELLITUS WITHOUT COMPLICATION, WITHOUT LONG-TERM CURRENT USE OF INSULIN: Primary | ICD-10-CM

## 2022-06-29 ENCOUNTER — LAB (OUTPATIENT)
Dept: LAB | Facility: HOSPITAL | Age: 57
End: 2022-06-29

## 2022-06-29 DIAGNOSIS — E11.9 TYPE 2 DIABETES MELLITUS WITHOUT COMPLICATION, WITHOUT LONG-TERM CURRENT USE OF INSULIN: ICD-10-CM

## 2022-06-29 PROCEDURE — 36415 COLL VENOUS BLD VENIPUNCTURE: CPT

## 2022-06-29 PROCEDURE — 84443 ASSAY THYROID STIM HORMONE: CPT

## 2022-06-29 PROCEDURE — 85027 COMPLETE CBC AUTOMATED: CPT

## 2022-06-29 PROCEDURE — 83036 HEMOGLOBIN GLYCOSYLATED A1C: CPT

## 2022-06-29 PROCEDURE — 84439 ASSAY OF FREE THYROXINE: CPT

## 2022-06-29 PROCEDURE — 80053 COMPREHEN METABOLIC PANEL: CPT

## 2022-06-29 PROCEDURE — 80061 LIPID PANEL: CPT

## 2022-06-29 RX ORDER — SERTRALINE HYDROCHLORIDE 100 MG/1
100 TABLET, FILM COATED ORAL DAILY
COMMUNITY
Start: 2022-05-27 | End: 2022-09-29 | Stop reason: SDUPTHER

## 2022-06-30 LAB
ALBUMIN SERPL-MCNC: 4.2 G/DL (ref 3.5–5.2)
ALBUMIN/GLOB SERPL: 1.4 G/DL
ALP SERPL-CCNC: 100 U/L (ref 39–117)
ALT SERPL W P-5'-P-CCNC: 12 U/L (ref 1–33)
ANION GAP SERPL CALCULATED.3IONS-SCNC: 10.8 MMOL/L (ref 5–15)
AST SERPL-CCNC: 14 U/L (ref 1–32)
BILIRUB SERPL-MCNC: 0.4 MG/DL (ref 0–1.2)
BUN SERPL-MCNC: 11 MG/DL (ref 6–20)
BUN/CREAT SERPL: 13.8 (ref 7–25)
CALCIUM SPEC-SCNC: 9.1 MG/DL (ref 8.6–10.5)
CHLORIDE SERPL-SCNC: 105 MMOL/L (ref 98–107)
CHOLEST SERPL-MCNC: 199 MG/DL (ref 0–200)
CO2 SERPL-SCNC: 24.2 MMOL/L (ref 22–29)
CREAT SERPL-MCNC: 0.8 MG/DL (ref 0.57–1)
DEPRECATED RDW RBC AUTO: 42.5 FL (ref 37–54)
EGFRCR SERPLBLD CKD-EPI 2021: 87.1 ML/MIN/1.73
ERYTHROCYTE [DISTWIDTH] IN BLOOD BY AUTOMATED COUNT: 14.7 % (ref 12.3–15.4)
GLOBULIN UR ELPH-MCNC: 2.9 GM/DL
GLUCOSE SERPL-MCNC: 109 MG/DL (ref 65–99)
HBA1C MFR BLD: 6.7 % (ref 4.8–5.6)
HCT VFR BLD AUTO: 40.7 % (ref 34–46.6)
HDLC SERPL-MCNC: 50 MG/DL (ref 40–60)
HGB BLD-MCNC: 12.7 G/DL (ref 12–15.9)
LDLC SERPL CALC-MCNC: 129 MG/DL (ref 0–100)
LDLC/HDLC SERPL: 2.53 {RATIO}
MCH RBC QN AUTO: 25.3 PG (ref 26.6–33)
MCHC RBC AUTO-ENTMCNC: 31.2 G/DL (ref 31.5–35.7)
MCV RBC AUTO: 81.2 FL (ref 79–97)
PLATELET # BLD AUTO: 223 10*3/MM3 (ref 140–450)
PMV BLD AUTO: 10.2 FL (ref 6–12)
POTASSIUM SERPL-SCNC: 4.2 MMOL/L (ref 3.5–5.2)
PROT SERPL-MCNC: 7.1 G/DL (ref 6–8.5)
RBC # BLD AUTO: 5.01 10*6/MM3 (ref 3.77–5.28)
SODIUM SERPL-SCNC: 140 MMOL/L (ref 136–145)
T4 FREE SERPL-MCNC: 0.93 NG/DL (ref 0.93–1.7)
TRIGL SERPL-MCNC: 113 MG/DL (ref 0–150)
TSH SERPL DL<=0.05 MIU/L-ACNC: 3.35 UIU/ML (ref 0.27–4.2)
VLDLC SERPL-MCNC: 20 MG/DL (ref 5–40)
WBC NRBC COR # BLD: 7.24 10*3/MM3 (ref 3.4–10.8)

## 2022-07-05 ENCOUNTER — OFFICE VISIT (OUTPATIENT)
Dept: FAMILY MEDICINE CLINIC | Facility: CLINIC | Age: 57
End: 2022-07-05

## 2022-07-05 VITALS
BODY MASS INDEX: 32.85 KG/M2 | WEIGHT: 204.4 LBS | HEIGHT: 66 IN | HEART RATE: 89 BPM | TEMPERATURE: 98.4 F | OXYGEN SATURATION: 98 % | SYSTOLIC BLOOD PRESSURE: 116 MMHG | DIASTOLIC BLOOD PRESSURE: 62 MMHG

## 2022-07-05 DIAGNOSIS — G62.9 NEUROPATHY: ICD-10-CM

## 2022-07-05 DIAGNOSIS — I73.9 CLAUDICATION: ICD-10-CM

## 2022-07-05 DIAGNOSIS — E11.9 TYPE 2 DIABETES MELLITUS WITHOUT COMPLICATION, WITHOUT LONG-TERM CURRENT USE OF INSULIN: Primary | ICD-10-CM

## 2022-07-05 DIAGNOSIS — M79.605 LEG PAIN, BILATERAL: ICD-10-CM

## 2022-07-05 DIAGNOSIS — M79.604 LEG PAIN, BILATERAL: ICD-10-CM

## 2022-07-05 PROCEDURE — 99214 OFFICE O/P EST MOD 30 MIN: CPT | Performed by: NURSE PRACTITIONER

## 2022-07-05 RX ORDER — GABAPENTIN 300 MG/1
300 CAPSULE ORAL 3 TIMES DAILY
Qty: 270 CAPSULE | Refills: 1 | Status: SHIPPED | OUTPATIENT
Start: 2022-07-05 | End: 2022-09-30 | Stop reason: SDUPTHER

## 2022-07-05 RX ORDER — METFORMIN HYDROCHLORIDE 500 MG/1
500 TABLET, EXTENDED RELEASE ORAL 2 TIMES DAILY
Qty: 180 TABLET | Refills: 1 | Status: SHIPPED | OUTPATIENT
Start: 2022-07-05 | End: 2022-09-29 | Stop reason: SDUPTHER

## 2022-07-05 NOTE — PROGRESS NOTES
"Chief Complaint  Leg Pain    Subjective        Clementina Rios presents to [facility]  She presents to the office today for follow-up regarding her diabetes.  I did review her recent labs including her A1c of 6.7%.  Patient does state that she tried to reduce her metformin to once a day but states that her blood glucose levels are staying around 175.  I did discuss staying on the metformin twice a day.  Patient also does continue to take the Jardiance 25 mg daily all other labs were unremarkable.  She does state that she continues to have bilateral leg pain.  She does state that the Pletal did help her symptoms.  She does state that it causes a lot of GI symptoms.  Also discussed obtaining magnesium and B complex levels to further evaluate leg pain.  Due to the Pletal causing improvement in the pain I explained to the patient that I did feel that the pain was still caused by claudication.  I explained that we had other medication options if she did not tolerate that one well does state that she is needing refills on her metformin as well as her gabapentin.    Leg Pain         Objective   Vital Signs:  /62 (BP Location: Right arm, Patient Position: Sitting, Cuff Size: Adult)   Pulse 89   Temp 98.4 °F (36.9 °C) (Temporal)   Ht 167.6 cm (66\")   Wt 92.7 kg (204 lb 6.4 oz)   SpO2 98%   BMI 32.99 kg/m²   Estimated body mass index is 32.99 kg/m² as calculated from the following:    Height as of this encounter: 167.6 cm (66\").    Weight as of this encounter: 92.7 kg (204 lb 6.4 oz).    BMI is >= 30 and <35. (Class 1 Obesity). The following options were offered after discussion;: nutrition counseling/recommendations      Physical Exam  Vitals reviewed.   Constitutional:       Appearance: Normal appearance.   Cardiovascular:      Rate and Rhythm: Normal rate and regular rhythm.      Pulses: Normal pulses.      Heart sounds: Normal heart sounds, S1 normal and S2 normal. No " murmur heard.  Pulmonary:      Effort: Pulmonary effort is normal. No respiratory distress.      Breath sounds: Normal breath sounds.   Skin:     General: Skin is warm and dry.   Neurological:      Mental Status: She is alert and oriented to person, place, and time.   Psychiatric:         Attention and Perception: Attention normal.         Mood and Affect: Mood normal.         Behavior: Behavior normal.        Result Review :               Assessment and Plan   Diagnoses and all orders for this visit:    1. Type 2 diabetes mellitus without complication, without long-term current use of insulin (HCC) (Primary)  -     metFORMIN ER (GLUCOPHAGE-XR) 500 MG 24 hr tablet; Take 1 tablet by mouth 2 (Two) Times a Day.  Dispense: 180 tablet; Refill: 1    2. Leg pain, bilateral  -     Magnesium; Future  -     Vitamin B6; Future    3. Neuropathy  -     gabapentin (NEURONTIN) 300 MG capsule; Take 1 capsule by mouth 3 (Three) Times a Day.  Dispense: 270 capsule; Refill: 1    4. Claudication (HCC)  Comments:  We will continue Pletal 100 mg twice daily at this time             Follow Up   Return in about 6 months (around 1/5/2023) for Recheck.  Patient was given instructions and counseling regarding her condition or for health maintenance advice. Please see specific information pulled into the AVS if appropriate.

## 2022-07-08 ENCOUNTER — LAB (OUTPATIENT)
Dept: LAB | Facility: HOSPITAL | Age: 57
End: 2022-07-08

## 2022-07-08 DIAGNOSIS — M79.605 LEG PAIN, BILATERAL: ICD-10-CM

## 2022-07-08 DIAGNOSIS — M79.604 LEG PAIN, BILATERAL: ICD-10-CM

## 2022-07-08 LAB — MAGNESIUM SERPL-MCNC: 1.9 MG/DL (ref 1.6–2.6)

## 2022-07-08 PROCEDURE — 83735 ASSAY OF MAGNESIUM: CPT

## 2022-07-08 PROCEDURE — 36415 COLL VENOUS BLD VENIPUNCTURE: CPT

## 2022-07-08 PROCEDURE — 84207 ASSAY OF VITAMIN B-6: CPT

## 2022-07-13 DIAGNOSIS — E11.9 TYPE 2 DIABETES MELLITUS WITHOUT COMPLICATION, WITHOUT LONG-TERM CURRENT USE OF INSULIN: ICD-10-CM

## 2022-07-13 DIAGNOSIS — I73.9 CLAUDICATION: ICD-10-CM

## 2022-07-13 RX ORDER — CILOSTAZOL 100 MG/1
100 TABLET ORAL 2 TIMES DAILY
Qty: 180 TABLET | Refills: 1 | Status: SHIPPED | OUTPATIENT
Start: 2022-07-13 | End: 2022-09-29 | Stop reason: SDUPTHER

## 2022-07-14 LAB — VIT B6 SERPL-MCNC: 5.4 UG/L (ref 3.4–65.2)

## 2022-09-16 ENCOUNTER — OFFICE VISIT (OUTPATIENT)
Dept: FAMILY MEDICINE CLINIC | Facility: CLINIC | Age: 57
End: 2022-09-16

## 2022-09-16 VITALS
BODY MASS INDEX: 32.78 KG/M2 | TEMPERATURE: 98.2 F | WEIGHT: 204 LBS | RESPIRATION RATE: 19 BRPM | HEIGHT: 66 IN | DIASTOLIC BLOOD PRESSURE: 72 MMHG | OXYGEN SATURATION: 97 % | SYSTOLIC BLOOD PRESSURE: 120 MMHG | HEART RATE: 82 BPM

## 2022-09-16 DIAGNOSIS — J06.9 ACUTE URI: Primary | ICD-10-CM

## 2022-09-16 DIAGNOSIS — H65.113 NON-RECURRENT ACUTE ALLERGIC OTITIS MEDIA OF BOTH EARS: ICD-10-CM

## 2022-09-16 PROCEDURE — 99213 OFFICE O/P EST LOW 20 MIN: CPT | Performed by: STUDENT IN AN ORGANIZED HEALTH CARE EDUCATION/TRAINING PROGRAM

## 2022-09-16 PROCEDURE — 87880 STREP A ASSAY W/OPTIC: CPT | Performed by: STUDENT IN AN ORGANIZED HEALTH CARE EDUCATION/TRAINING PROGRAM

## 2022-09-16 PROCEDURE — 87428 SARSCOV & INF VIR A&B AG IA: CPT | Performed by: STUDENT IN AN ORGANIZED HEALTH CARE EDUCATION/TRAINING PROGRAM

## 2022-09-16 RX ORDER — AZITHROMYCIN 250 MG/1
TABLET, FILM COATED ORAL
Qty: 6 TABLET | Refills: 0 | Status: SHIPPED | OUTPATIENT
Start: 2022-09-16 | End: 2022-09-29 | Stop reason: ALTCHOICE

## 2022-09-16 RX ORDER — PREDNISONE 20 MG/1
40 TABLET ORAL DAILY
Qty: 10 TABLET | Refills: 0 | Status: SHIPPED | OUTPATIENT
Start: 2022-09-16 | End: 2022-09-29 | Stop reason: ALTCHOICE

## 2022-09-16 RX ORDER — PREDNISONE 20 MG/1
40 TABLET ORAL DAILY
Qty: 10 TABLET | Refills: 0 | Status: SHIPPED | OUTPATIENT
Start: 2022-09-16 | End: 2022-09-16

## 2022-09-16 RX ORDER — AZITHROMYCIN 250 MG/1
TABLET, FILM COATED ORAL
Qty: 6 TABLET | Refills: 0 | Status: SHIPPED | OUTPATIENT
Start: 2022-09-16 | End: 2022-09-16

## 2022-09-16 NOTE — PROGRESS NOTES
"Chief Complaint  Bilateral ears pain    Subjective         Clementina Rios is a 57 y.o. female who presents to [facility]    57 years old female comes to the clinic today for an acute visit.    Reports few days history of bilateral ear pain, more on right than left.  Patient does have a history of recurrent ear effusion/fluid in ears and was seen by ENT in the past.    Reports no fever/chest pain or shortness of breath but does report sinus congestion and possible sinus drainage since last 1 day.  Denies any coughing/abdominal symptoms or any sick contacts/recent travel.    Objective   Vital Signs:   Vitals:    09/16/22 1309   BP: 120/72   Pulse: 82   Resp: 19   Temp: 98.2 °F (36.8 °C)   SpO2: 97%   Weight: 92.5 kg (204 lb)   Height: 167.6 cm (66\")      Body mass index is 32.93 kg/m².   Wt Readings from Last 3 Encounters:   09/16/22 92.5 kg (204 lb)   07/05/22 92.7 kg (204 lb 6.4 oz)   05/11/22 93.1 kg (205 lb 4.8 oz)      BP Readings from Last 3 Encounters:   09/16/22 120/72   07/05/22 116/62   05/11/22 120/74        Patient Care Team:  Daniel Mares APRN as PCP - General (Nurse Practitioner)     Physical Exam  HENT:      Right Ear: A middle ear effusion is present. Tympanic membrane is erythematous. Tympanic membrane has decreased mobility.      Left Ear: Tympanic membrane has decreased mobility.      Mouth/Throat:      Pharynx: Pharyngeal swelling present. No oropharyngeal exudate, posterior oropharyngeal erythema or uvula swelling.   Pulmonary:      Effort: Pulmonary effort is normal.      Breath sounds: Normal breath sounds.                       Assessment and Plan   Diagnoses and all orders for this visit:    1. Acute URI (Primary)  -     POCT SARS-CoV-2 Antigen ZARIA + Flu  -     POCT rapid strep A  -     Discontinue: azithromycin (Zithromax Z-Antoine) 250 MG tablet; Take 2 tablets by mouth on day 1, then 1 tablet daily on days 2-5  Dispense: 6 tablet; Refill: 0  -     " Discontinue: predniSONE (DELTASONE) 20 MG tablet; Take 2 tablets by mouth Daily.  Dispense: 10 tablet; Refill: 0  -     predniSONE (DELTASONE) 20 MG tablet; Take 2 tablets by mouth Daily.  Dispense: 10 tablet; Refill: 0  -     azithromycin (Zithromax Z-Antoine) 250 MG tablet; Take 2 tablets by mouth on day 1, then 1 tablet daily on days 2-5  Dispense: 6 tablet; Refill: 0    2. Non-recurrent acute allergic otitis media of both ears  -     Discontinue: azithromycin (Zithromax Z-Antoine) 250 MG tablet; Take 2 tablets by mouth on day 1, then 1 tablet daily on days 2-5  Dispense: 6 tablet; Refill: 0  -     Discontinue: predniSONE (DELTASONE) 20 MG tablet; Take 2 tablets by mouth Daily.  Dispense: 10 tablet; Refill: 0  -     predniSONE (DELTASONE) 20 MG tablet; Take 2 tablets by mouth Daily.  Dispense: 10 tablet; Refill: 0  -     azithromycin (Zithromax Z-Antoine) 250 MG tablet; Take 2 tablets by mouth on day 1, then 1 tablet daily on days 2-5  Dispense: 6 tablet; Refill: 0      After reviewing patient's symptoms and physical examination, I will treat patient with Z-Antoine and prednisone.  Patient reports that last time it worked great for her.  I have considered patient's diabetes and last A1c is below 7.0, precautions with prednisone and blood sugars discussed.    Follow-up recommended if no improvement or any worsening.    Negative flu/COVID/strep.  Tobacco Use: Low Risk    • Smoking Tobacco Use: Never Smoker   • Smokeless Tobacco Use: Never Used            Follow Up   No follow-ups on file.  Patient was given instructions and counseling regarding her condition or for health maintenance advice. Please see specific information pulled into the AVS if appropriate.

## 2022-09-21 ENCOUNTER — TELEPHONE (OUTPATIENT)
Dept: FAMILY MEDICINE CLINIC | Facility: CLINIC | Age: 57
End: 2022-09-21

## 2022-09-21 NOTE — TELEPHONE ENCOUNTER
Patient would like Daniel to call her at [phone] ( latha) she has a item for him to  tomorrow at her work.

## 2022-09-26 RX ORDER — MONTELUKAST SODIUM 10 MG/1
TABLET ORAL
Qty: 30 TABLET | Refills: 3 | Status: SHIPPED | OUTPATIENT
Start: 2022-09-26 | End: 2022-09-29 | Stop reason: SDUPTHER

## 2022-09-29 DIAGNOSIS — I73.9 CLAUDICATION: ICD-10-CM

## 2022-09-29 DIAGNOSIS — E11.9 TYPE 2 DIABETES MELLITUS WITHOUT COMPLICATION, WITHOUT LONG-TERM CURRENT USE OF INSULIN: ICD-10-CM

## 2022-09-29 RX ORDER — CILOSTAZOL 100 MG/1
100 TABLET ORAL 2 TIMES DAILY
Qty: 180 TABLET | Refills: 1 | Status: SHIPPED | OUTPATIENT
Start: 2022-09-29 | End: 2023-03-27 | Stop reason: SDUPTHER

## 2022-09-29 RX ORDER — METFORMIN HYDROCHLORIDE 500 MG/1
500 TABLET, EXTENDED RELEASE ORAL 2 TIMES DAILY
Qty: 180 TABLET | Refills: 1 | Status: SHIPPED | OUTPATIENT
Start: 2022-09-29 | End: 2023-03-27 | Stop reason: SDUPTHER

## 2022-09-29 RX ORDER — EZETIMIBE 10 MG/1
10 TABLET ORAL DAILY
Qty: 90 TABLET | Refills: 1 | Status: SHIPPED | OUTPATIENT
Start: 2022-09-29 | End: 2022-11-16 | Stop reason: SDUPTHER

## 2022-09-29 RX ORDER — SERTRALINE HYDROCHLORIDE 100 MG/1
100 TABLET, FILM COATED ORAL DAILY
Qty: 90 TABLET | Refills: 1 | Status: SHIPPED | OUTPATIENT
Start: 2022-09-29 | End: 2023-03-27 | Stop reason: SDUPTHER

## 2022-09-29 RX ORDER — MONTELUKAST SODIUM 10 MG/1
10 TABLET ORAL EVERY EVENING
Qty: 30 TABLET | Refills: 3 | Status: SHIPPED | OUTPATIENT
Start: 2022-09-29 | End: 2023-03-27 | Stop reason: SDUPTHER

## 2022-09-30 DIAGNOSIS — G62.9 NEUROPATHY: ICD-10-CM

## 2022-09-30 RX ORDER — ESZOPICLONE 3 MG/1
3 TABLET, FILM COATED ORAL NIGHTLY
Qty: 90 TABLET | Refills: 0 | Status: SHIPPED | OUTPATIENT
Start: 2022-09-30 | End: 2022-10-31 | Stop reason: SDUPTHER

## 2022-09-30 RX ORDER — DULAGLUTIDE 0.75 MG/.5ML
0.75 INJECTION, SOLUTION SUBCUTANEOUS WEEKLY
Qty: 2 ML | Refills: 0 | Status: SHIPPED | OUTPATIENT
Start: 2022-09-30 | End: 2022-10-20 | Stop reason: SDUPTHER

## 2022-09-30 RX ORDER — ESZOPICLONE 3 MG/1
3 TABLET, FILM COATED ORAL NIGHTLY
Qty: 30 TABLET | Refills: 2 | Status: SHIPPED | OUTPATIENT
Start: 2022-09-30 | End: 2022-09-30 | Stop reason: SDUPTHER

## 2022-09-30 RX ORDER — GABAPENTIN 300 MG/1
300 CAPSULE ORAL 3 TIMES DAILY
Qty: 270 CAPSULE | Refills: 1 | Status: SHIPPED | OUTPATIENT
Start: 2022-09-30 | End: 2023-01-04 | Stop reason: SDUPTHER

## 2022-09-30 NOTE — TELEPHONE ENCOUNTER
----- Message from Clementina Rios sent at 9/30/2022 12:39 PM EDT -----  Regarding: medication  Thank you for this information. Can,you provide me with a list of medications.

## 2022-10-21 RX ORDER — DULAGLUTIDE 0.75 MG/.5ML
0.75 INJECTION, SOLUTION SUBCUTANEOUS WEEKLY
Qty: 6 ML | Refills: 1 | Status: SHIPPED | OUTPATIENT
Start: 2022-10-21

## 2022-10-31 RX ORDER — ESZOPICLONE 3 MG/1
3 TABLET, FILM COATED ORAL NIGHTLY
Qty: 90 TABLET | Refills: 0 | Status: SHIPPED | OUTPATIENT
Start: 2022-10-31 | End: 2022-12-29 | Stop reason: SDUPTHER

## 2022-10-31 RX ORDER — ESZOPICLONE 3 MG/1
3 TABLET, FILM COATED ORAL NIGHTLY
Qty: 90 TABLET | Refills: 0 | Status: SHIPPED | OUTPATIENT
Start: 2022-10-31 | End: 2022-10-31 | Stop reason: SDUPTHER

## 2022-11-18 RX ORDER — EZETIMIBE 10 MG/1
10 TABLET ORAL DAILY
Qty: 90 TABLET | Refills: 1 | Status: SHIPPED | OUTPATIENT
Start: 2022-11-18 | End: 2023-02-16 | Stop reason: SDUPTHER

## 2022-12-07 ENCOUNTER — TELEPHONE (OUTPATIENT)
Dept: FAMILY MEDICINE CLINIC | Facility: CLINIC | Age: 57
End: 2022-12-07

## 2022-12-07 DIAGNOSIS — Z13.220 SCREENING FOR LIPID DISORDERS: ICD-10-CM

## 2022-12-07 DIAGNOSIS — E11.9 TYPE 2 DIABETES MELLITUS WITHOUT COMPLICATION, WITHOUT LONG-TERM CURRENT USE OF INSULIN: Primary | ICD-10-CM

## 2022-12-07 NOTE — TELEPHONE ENCOUNTER
----- Message from Clementina Rios sent at 12/7/2022  4:37 PM EST -----  Regarding: A1C  Contact: [phone]  If you send a order over I can go have my blood work completed. Just let me know so I can fast the night before.

## 2022-12-13 ENCOUNTER — LAB (OUTPATIENT)
Dept: LAB | Facility: HOSPITAL | Age: 57
End: 2022-12-13

## 2022-12-13 DIAGNOSIS — Z13.220 SCREENING FOR LIPID DISORDERS: ICD-10-CM

## 2022-12-13 DIAGNOSIS — E11.9 TYPE 2 DIABETES MELLITUS WITHOUT COMPLICATION, WITHOUT LONG-TERM CURRENT USE OF INSULIN: ICD-10-CM

## 2022-12-13 LAB
ALBUMIN SERPL-MCNC: 4.4 G/DL (ref 3.5–5.2)
ALBUMIN UR-MCNC: <1.2 MG/DL
ALBUMIN/GLOB SERPL: 2.1 G/DL
ALP SERPL-CCNC: 100 U/L (ref 39–117)
ALT SERPL W P-5'-P-CCNC: 12 U/L (ref 1–33)
ANION GAP SERPL CALCULATED.3IONS-SCNC: 7.4 MMOL/L (ref 5–15)
AST SERPL-CCNC: 15 U/L (ref 1–32)
BASOPHILS # BLD AUTO: 0.01 10*3/MM3 (ref 0–0.2)
BASOPHILS NFR BLD AUTO: 0.2 % (ref 0–1.5)
BILIRUB SERPL-MCNC: 0.2 MG/DL (ref 0–1.2)
BUN SERPL-MCNC: 10 MG/DL (ref 6–20)
BUN/CREAT SERPL: 10.9 (ref 7–25)
CALCIUM SPEC-SCNC: 9 MG/DL (ref 8.6–10.5)
CHLORIDE SERPL-SCNC: 105 MMOL/L (ref 98–107)
CHOLEST SERPL-MCNC: 189 MG/DL (ref 0–200)
CO2 SERPL-SCNC: 28.6 MMOL/L (ref 22–29)
CREAT SERPL-MCNC: 0.92 MG/DL (ref 0.57–1)
DEPRECATED RDW RBC AUTO: 45.2 FL (ref 37–54)
EGFRCR SERPLBLD CKD-EPI 2021: 73.7 ML/MIN/1.73
EOSINOPHIL # BLD AUTO: 0.2 10*3/MM3 (ref 0–0.4)
EOSINOPHIL NFR BLD AUTO: 3.5 % (ref 0.3–6.2)
ERYTHROCYTE [DISTWIDTH] IN BLOOD BY AUTOMATED COUNT: 15.2 % (ref 12.3–15.4)
GLOBULIN UR ELPH-MCNC: 2.1 GM/DL
GLUCOSE SERPL-MCNC: 114 MG/DL (ref 65–99)
HBA1C MFR BLD: 6.2 % (ref 4.8–5.6)
HCT VFR BLD AUTO: 40.3 % (ref 34–46.6)
HDLC SERPL-MCNC: 44 MG/DL (ref 40–60)
HGB BLD-MCNC: 12.6 G/DL (ref 12–15.9)
IMM GRANULOCYTES # BLD AUTO: 0.01 10*3/MM3 (ref 0–0.05)
IMM GRANULOCYTES NFR BLD AUTO: 0.2 % (ref 0–0.5)
LDLC SERPL CALC-MCNC: 119 MG/DL (ref 0–100)
LDLC/HDLC SERPL: 2.62 {RATIO}
LYMPHOCYTES # BLD AUTO: 1.85 10*3/MM3 (ref 0.7–3.1)
LYMPHOCYTES NFR BLD AUTO: 32.3 % (ref 19.6–45.3)
MCH RBC QN AUTO: 25.4 PG (ref 26.6–33)
MCHC RBC AUTO-ENTMCNC: 31.3 G/DL (ref 31.5–35.7)
MCV RBC AUTO: 81.3 FL (ref 79–97)
MONOCYTES # BLD AUTO: 0.31 10*3/MM3 (ref 0.1–0.9)
MONOCYTES NFR BLD AUTO: 5.4 % (ref 5–12)
NEUTROPHILS NFR BLD AUTO: 3.34 10*3/MM3 (ref 1.7–7)
NEUTROPHILS NFR BLD AUTO: 58.4 % (ref 42.7–76)
NRBC BLD AUTO-RTO: 0 /100 WBC (ref 0–0.2)
PLATELET # BLD AUTO: 243 10*3/MM3 (ref 140–450)
PMV BLD AUTO: 10.6 FL (ref 6–12)
POTASSIUM SERPL-SCNC: 3.8 MMOL/L (ref 3.5–5.2)
PROT SERPL-MCNC: 6.5 G/DL (ref 6–8.5)
RBC # BLD AUTO: 4.96 10*6/MM3 (ref 3.77–5.28)
SODIUM SERPL-SCNC: 141 MMOL/L (ref 136–145)
TRIGL SERPL-MCNC: 148 MG/DL (ref 0–150)
TSH SERPL DL<=0.05 MIU/L-ACNC: 5.14 UIU/ML (ref 0.27–4.2)
VLDLC SERPL-MCNC: 26 MG/DL (ref 5–40)
WBC NRBC COR # BLD: 5.72 10*3/MM3 (ref 3.4–10.8)

## 2022-12-13 PROCEDURE — 83036 HEMOGLOBIN GLYCOSYLATED A1C: CPT

## 2022-12-13 PROCEDURE — 85025 COMPLETE CBC W/AUTO DIFF WBC: CPT

## 2022-12-13 PROCEDURE — 36415 COLL VENOUS BLD VENIPUNCTURE: CPT

## 2022-12-13 PROCEDURE — 82043 UR ALBUMIN QUANTITATIVE: CPT

## 2022-12-13 PROCEDURE — 80061 LIPID PANEL: CPT

## 2022-12-13 PROCEDURE — 80053 COMPREHEN METABOLIC PANEL: CPT

## 2022-12-13 PROCEDURE — 84443 ASSAY THYROID STIM HORMONE: CPT

## 2022-12-16 RX ORDER — ONDANSETRON 4 MG/1
4 TABLET, FILM COATED ORAL EVERY 8 HOURS PRN
Qty: 30 TABLET | Refills: 0 | Status: SHIPPED | OUTPATIENT
Start: 2022-12-16

## 2023-01-03 RX ORDER — ESZOPICLONE 3 MG/1
3 TABLET, FILM COATED ORAL NIGHTLY
Qty: 90 TABLET | Refills: 0 | Status: SHIPPED | OUTPATIENT
Start: 2023-01-03

## 2023-01-04 DIAGNOSIS — G62.9 NEUROPATHY: ICD-10-CM

## 2023-01-04 RX ORDER — GABAPENTIN 300 MG/1
300 CAPSULE ORAL 3 TIMES DAILY
Qty: 270 CAPSULE | Refills: 1 | Status: SHIPPED | OUTPATIENT
Start: 2023-01-04

## 2023-01-31 DIAGNOSIS — E11.9 TYPE 2 DIABETES MELLITUS WITHOUT COMPLICATION, WITHOUT LONG-TERM CURRENT USE OF INSULIN: Primary | ICD-10-CM

## 2023-01-31 DIAGNOSIS — Z13.220 SCREENING FOR LIPID DISORDERS: ICD-10-CM

## 2023-01-31 DIAGNOSIS — G62.9 NEUROPATHY: ICD-10-CM

## 2023-02-14 ENCOUNTER — LAB (OUTPATIENT)
Dept: LAB | Facility: HOSPITAL | Age: 58
End: 2023-02-14
Payer: OTHER GOVERNMENT

## 2023-02-14 DIAGNOSIS — Z13.220 SCREENING FOR LIPID DISORDERS: ICD-10-CM

## 2023-02-14 DIAGNOSIS — E11.9 TYPE 2 DIABETES MELLITUS WITHOUT COMPLICATION, WITHOUT LONG-TERM CURRENT USE OF INSULIN: ICD-10-CM

## 2023-02-14 DIAGNOSIS — G62.9 NEUROPATHY: ICD-10-CM

## 2023-02-14 LAB
ALBUMIN SERPL-MCNC: 4.3 G/DL (ref 3.5–5.2)
ALBUMIN/GLOB SERPL: 1.8 G/DL
ALP SERPL-CCNC: 103 U/L (ref 39–117)
ALT SERPL W P-5'-P-CCNC: 16 U/L (ref 1–33)
ANION GAP SERPL CALCULATED.3IONS-SCNC: 6.6 MMOL/L (ref 5–15)
AST SERPL-CCNC: 14 U/L (ref 1–32)
BILIRUB SERPL-MCNC: 0.3 MG/DL (ref 0–1.2)
BUN SERPL-MCNC: 13 MG/DL (ref 6–20)
BUN/CREAT SERPL: 14.4 (ref 7–25)
CALCIUM SPEC-SCNC: 9.3 MG/DL (ref 8.6–10.5)
CHLORIDE SERPL-SCNC: 105 MMOL/L (ref 98–107)
CHOLEST SERPL-MCNC: 201 MG/DL (ref 0–200)
CO2 SERPL-SCNC: 26.4 MMOL/L (ref 22–29)
CREAT SERPL-MCNC: 0.9 MG/DL (ref 0.57–1)
DEPRECATED RDW RBC AUTO: 42.2 FL (ref 37–54)
EGFRCR SERPLBLD CKD-EPI 2021: 75.7 ML/MIN/1.73
ERYTHROCYTE [DISTWIDTH] IN BLOOD BY AUTOMATED COUNT: 14.7 % (ref 12.3–15.4)
GLOBULIN UR ELPH-MCNC: 2.4 GM/DL
GLUCOSE SERPL-MCNC: 139 MG/DL (ref 65–99)
HBA1C MFR BLD: 6.5 % (ref 4.8–5.6)
HCT VFR BLD AUTO: 40.5 % (ref 34–46.6)
HDLC SERPL-MCNC: 54 MG/DL (ref 40–60)
HGB BLD-MCNC: 13.2 G/DL (ref 12–15.9)
LDLC SERPL CALC-MCNC: 122 MG/DL (ref 0–100)
LDLC/HDLC SERPL: 2.19 {RATIO}
MCH RBC QN AUTO: 26 PG (ref 26.6–33)
MCHC RBC AUTO-ENTMCNC: 32.6 G/DL (ref 31.5–35.7)
MCV RBC AUTO: 79.7 FL (ref 79–97)
PLATELET # BLD AUTO: 247 10*3/MM3 (ref 140–450)
PMV BLD AUTO: 10.5 FL (ref 6–12)
POTASSIUM SERPL-SCNC: 4.6 MMOL/L (ref 3.5–5.2)
PROT SERPL-MCNC: 6.7 G/DL (ref 6–8.5)
RBC # BLD AUTO: 5.08 10*6/MM3 (ref 3.77–5.28)
SODIUM SERPL-SCNC: 138 MMOL/L (ref 136–145)
T4 FREE SERPL-MCNC: 1 NG/DL (ref 0.93–1.7)
TRIGL SERPL-MCNC: 143 MG/DL (ref 0–150)
TSH SERPL DL<=0.05 MIU/L-ACNC: 4.88 UIU/ML (ref 0.27–4.2)
VLDLC SERPL-MCNC: 25 MG/DL (ref 5–40)
WBC NRBC COR # BLD: 7.35 10*3/MM3 (ref 3.4–10.8)

## 2023-02-14 PROCEDURE — 83036 HEMOGLOBIN GLYCOSYLATED A1C: CPT

## 2023-02-14 PROCEDURE — 85027 COMPLETE CBC AUTOMATED: CPT

## 2023-02-14 PROCEDURE — 84443 ASSAY THYROID STIM HORMONE: CPT

## 2023-02-14 PROCEDURE — 84439 ASSAY OF FREE THYROXINE: CPT

## 2023-02-14 PROCEDURE — 36415 COLL VENOUS BLD VENIPUNCTURE: CPT

## 2023-02-14 PROCEDURE — 80053 COMPREHEN METABOLIC PANEL: CPT

## 2023-02-14 PROCEDURE — 80061 LIPID PANEL: CPT

## 2023-02-16 RX ORDER — EZETIMIBE 10 MG/1
10 TABLET ORAL DAILY
Qty: 90 TABLET | Refills: 1 | Status: SHIPPED | OUTPATIENT
Start: 2023-02-16

## 2023-02-22 ENCOUNTER — OFFICE VISIT (OUTPATIENT)
Dept: FAMILY MEDICINE CLINIC | Facility: CLINIC | Age: 58
End: 2023-02-22
Payer: OTHER GOVERNMENT

## 2023-02-22 VITALS
OXYGEN SATURATION: 99 % | TEMPERATURE: 97.1 F | HEIGHT: 66 IN | SYSTOLIC BLOOD PRESSURE: 116 MMHG | DIASTOLIC BLOOD PRESSURE: 74 MMHG | HEART RATE: 124 BPM | BODY MASS INDEX: 34.13 KG/M2 | WEIGHT: 212.4 LBS

## 2023-02-22 DIAGNOSIS — J02.9 PHARYNGITIS, UNSPECIFIED ETIOLOGY: Primary | ICD-10-CM

## 2023-02-22 DIAGNOSIS — E03.9 HYPOTHYROIDISM, UNSPECIFIED TYPE: ICD-10-CM

## 2023-02-22 DIAGNOSIS — R09.81 SINUS CONGESTION: ICD-10-CM

## 2023-02-22 DIAGNOSIS — R52 BODY ACHES: ICD-10-CM

## 2023-02-22 PROCEDURE — 87428 SARSCOV & INF VIR A&B AG IA: CPT | Performed by: NURSE PRACTITIONER

## 2023-02-22 PROCEDURE — 99213 OFFICE O/P EST LOW 20 MIN: CPT | Performed by: NURSE PRACTITIONER

## 2023-02-22 PROCEDURE — 87880 STREP A ASSAY W/OPTIC: CPT | Performed by: NURSE PRACTITIONER

## 2023-02-22 RX ORDER — LEVOTHYROXINE SODIUM 0.03 MG/1
25 TABLET ORAL
Qty: 90 TABLET | Refills: 1 | Status: SHIPPED | OUTPATIENT
Start: 2023-02-22 | End: 2023-02-22

## 2023-02-22 RX ORDER — LEVOTHYROXINE SODIUM 0.03 MG/1
25 TABLET ORAL
COMMUNITY
End: 2023-02-22 | Stop reason: SDUPTHER

## 2023-02-22 RX ORDER — AMOXICILLIN 500 MG/1
500 CAPSULE ORAL 2 TIMES DAILY
Qty: 20 CAPSULE | Refills: 0 | Status: SHIPPED | OUTPATIENT
Start: 2023-02-22 | End: 2023-03-04

## 2023-02-22 RX ORDER — LEVOTHYROXINE SODIUM 0.03 MG/1
25 TABLET ORAL
Qty: 90 TABLET | Refills: 1 | Status: SHIPPED | OUTPATIENT
Start: 2023-02-22

## 2023-02-22 NOTE — PROGRESS NOTES
"Chief Complaint  Sore Throat    Subjective         Clementina Rios presents to [facility]  Patient presents to the office today with complaints of cough sinus congestion and earache.  Patient states that she is using Stahist at this time.  She states symptoms started 2 days ago.  She does complain of general body aches rhinorrhea sore throat and sinus congestion and drainage she denies any fevers at this time.  She does state that she has had mild headaches, decreased appetite and the symptoms came on abruptly.  She denies any other symptoms such as nausea vomiting or diarrhea.  Patient also request her Synthroid to be sent to [facility].    Sore Throat          Objective     Vitals:    02/22/23 1049   BP: 116/74   BP Location: Right arm   Patient Position: Sitting   Cuff Size: Adult   Pulse: (!) 124   Temp: 97.1 °F (36.2 °C)   TempSrc: Temporal   SpO2: 99%   Weight: 96.3 kg (212 lb 6.4 oz)   Height: 167.6 cm (66\")      Body mass index is 34.28 kg/m².    BMI is >= 30 and <35. (Class 1 Obesity). The following options were offered after discussion;: nutrition counseling/recommendations        Physical Exam  Vitals reviewed.   Constitutional:       Appearance: Normal appearance.   HENT:      Right Ear: Hearing and ear canal normal. A middle ear effusion is present.      Left Ear: Hearing, tympanic membrane, ear canal and external ear normal.      Nose: Congestion and rhinorrhea present.      Mouth/Throat:      Pharynx: Posterior oropharyngeal erythema present.   Cardiovascular:      Rate and Rhythm: Normal rate and regular rhythm.      Pulses: Normal pulses.      Heart sounds: Normal heart sounds, S1 normal and S2 normal. No murmur heard.  Pulmonary:      Effort: Pulmonary effort is normal. No respiratory distress.      Breath sounds: Normal breath sounds.   Skin:     General: Skin is warm and dry.   Neurological:      Mental Status: She is alert and oriented to person, place, and " time.   Psychiatric:         Attention and Perception: Attention normal.         Mood and Affect: Mood normal.         Behavior: Behavior normal.          Result Review :   The following data was reviewed by: ABILIO Dickerson on 02/22/2023:      Procedures    Assessment and Plan   Diagnoses and all orders for this visit:    1. Pharyngitis, unspecified etiology (Primary)  -     POCT SARS-CoV-2 Antigen ZARIA + Flu  -     POCT rapid strep A  -     amoxicillin (AMOXIL) 500 MG capsule; Take 1 capsule by mouth 2 (Two) Times a Day for 10 days.  Dispense: 20 capsule; Refill: 0    2. Sinus congestion  -     POCT SARS-CoV-2 Antigen ZARIA + Flu  -     POCT rapid strep A  -     levothyroxine (SYNTHROID, LEVOTHROID) 25 MCG tablet; Take 1 tablet by mouth Every Morning.  Dispense: 90 tablet; Refill: 1    3. Body aches  -     POCT SARS-CoV-2 Antigen ZARIA + Flu  -     POCT rapid strep A    4. Hypothyroidism, unspecified type  -     Discontinue: levothyroxine (SYNTHROID, LEVOTHROID) 25 MCG tablet; Take 1 tablet by mouth Every Morning.  Dispense: 90 tablet; Refill: 1  -     levothyroxine (SYNTHROID, LEVOTHROID) 25 MCG tablet; Take 1 tablet by mouth Every Morning.  Dispense: 90 tablet; Refill: 1      I did encourage the patient to continue the Stahist.  I also encouraged her to increase her fluid intake.  He may use Tylenol or ibuprofen as needed for body aches.    Follow Up   Return if symptoms worsen or fail to improve.  Patient was given instructions and counseling regarding her condition or for health maintenance advice. Please see specific information pulled into the AVS if appropriate.

## 2023-03-27 DIAGNOSIS — I73.9 CLAUDICATION: ICD-10-CM

## 2023-03-27 DIAGNOSIS — E11.9 TYPE 2 DIABETES MELLITUS WITHOUT COMPLICATION, WITHOUT LONG-TERM CURRENT USE OF INSULIN: ICD-10-CM

## 2023-03-27 RX ORDER — SERTRALINE HYDROCHLORIDE 100 MG/1
100 TABLET, FILM COATED ORAL DAILY
Qty: 90 TABLET | Refills: 1 | Status: SHIPPED | OUTPATIENT
Start: 2023-03-27

## 2023-03-27 RX ORDER — CILOSTAZOL 100 MG/1
100 TABLET ORAL 2 TIMES DAILY
Qty: 180 TABLET | Refills: 1 | Status: SHIPPED | OUTPATIENT
Start: 2023-03-27

## 2023-03-27 RX ORDER — METFORMIN HYDROCHLORIDE 500 MG/1
500 TABLET, EXTENDED RELEASE ORAL 2 TIMES DAILY
Qty: 180 TABLET | Refills: 1 | Status: SHIPPED | OUTPATIENT
Start: 2023-03-27

## 2023-03-27 RX ORDER — MONTELUKAST SODIUM 10 MG/1
10 TABLET ORAL EVERY EVENING
Qty: 30 TABLET | Refills: 3 | Status: SHIPPED | OUTPATIENT
Start: 2023-03-27

## 2023-03-30 ENCOUNTER — TELEPHONE (OUTPATIENT)
Dept: FAMILY MEDICINE CLINIC | Facility: CLINIC | Age: 58
End: 2023-03-30
Payer: OTHER GOVERNMENT

## 2023-03-30 NOTE — TELEPHONE ENCOUNTER
Patient is requesting a referral to [facility].   Patient was previously with [facility] but she has not seen them in over 6 months and has not been able to get back in with them.     Patient is seeing behavioral health due to depression and PTSD.     Patient is aware provider is out of office on Thursdays.

## 2023-03-31 DIAGNOSIS — F41.9 ANXIETY: ICD-10-CM

## 2023-03-31 DIAGNOSIS — F33.0 MAJOR DEPRESSIVE DISORDER, RECURRENT, MILD: Primary | ICD-10-CM

## 2023-03-31 RX ORDER — SERTRALINE HYDROCHLORIDE 25 MG/1
25 TABLET, FILM COATED ORAL DAILY
Qty: 90 TABLET | Refills: 0 | Status: SHIPPED | OUTPATIENT
Start: 2023-03-31

## 2023-05-24 ENCOUNTER — TELEPHONE (OUTPATIENT)
Dept: FAMILY MEDICINE CLINIC | Facility: CLINIC | Age: 58
End: 2023-05-24
Payer: OTHER GOVERNMENT

## 2023-05-24 DIAGNOSIS — E11.9 TYPE 2 DIABETES MELLITUS WITHOUT COMPLICATION, WITHOUT LONG-TERM CURRENT USE OF INSULIN: Primary | ICD-10-CM

## 2023-05-24 DIAGNOSIS — E03.9 HYPOTHYROIDISM, UNSPECIFIED TYPE: ICD-10-CM

## 2023-05-24 NOTE — TELEPHONE ENCOUNTER
----- Message from Clementina Rios sent at 5/23/2023  6:36 PM EDT -----  Regarding: Labs  Contact: [phone]  Trent can you by chance send over the paperwork for me to get my labs done in the next 7 days?. Thank you and I hope you have a good weekend be safe
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
## 2023-06-05 NOTE — PATIENT INSTRUCTIONS
1.  Please return to clinic at your next scheduled visit.  Contact the clinic ([phone]) at least 24 hours prior in the event you need to cancel.  2.  Do no harm to yourself or others.    3.  Avoid alcohol and drugs.    4.  Take all medications as prescribed.  Please contact the clinic with any concerns. If you are in need of medication refills, please call the clinic at [phone].    5. Should you want to get in touch with your provider, Dr. Izzy Narayan, please utilize [employer] or contact the office ([phone]), and staff will be able to page Dr. Narayan directly.  6.  In the event you have personal crisis, contact the following crisis numbers: Suicide Prevention Hotline [phone]; ANTONY Helpline 9-822-224-ANTONY; [facility] Emergency Room [phone]; text HELLO to 890688; or [phone].

## 2023-06-06 ENCOUNTER — OFFICE VISIT (OUTPATIENT)
Dept: PSYCHIATRY | Facility: CLINIC | Age: 58
End: 2023-06-06
Payer: OTHER GOVERNMENT

## 2023-06-06 VITALS
BODY MASS INDEX: 34.75 KG/M2 | HEIGHT: 66 IN | HEART RATE: 80 BPM | WEIGHT: 216.2 LBS | SYSTOLIC BLOOD PRESSURE: 113 MMHG | DIASTOLIC BLOOD PRESSURE: 61 MMHG

## 2023-06-06 DIAGNOSIS — F33.1 MAJOR DEPRESSIVE DISORDER, RECURRENT EPISODE, MODERATE: Primary | ICD-10-CM

## 2023-06-06 DIAGNOSIS — F51.05 INSOMNIA DUE TO MENTAL CONDITION: ICD-10-CM

## 2023-06-06 DIAGNOSIS — F41.1 GENERALIZED ANXIETY DISORDER: ICD-10-CM

## 2023-06-06 DIAGNOSIS — F43.10 POST TRAUMATIC STRESS DISORDER (PTSD): ICD-10-CM

## 2023-06-06 DIAGNOSIS — Z63.4 BEREAVEMENT: ICD-10-CM

## 2023-06-06 RX ORDER — GUAIFENESIN 100 MG/5ML
SYRUP ORAL
COMMUNITY
Start: 2023-03-13 | End: 2023-06-06 | Stop reason: SDDI

## 2023-06-06 RX ORDER — ESZOPICLONE 3 MG/1
3 TABLET, FILM COATED ORAL NIGHTLY
Qty: 30 TABLET | Refills: 5 | Status: SHIPPED | OUTPATIENT
Start: 2023-06-30 | End: 2023-06-06 | Stop reason: SDUPTHER

## 2023-06-06 RX ORDER — SERTRALINE HYDROCHLORIDE 25 MG/1
25 TABLET, FILM COATED ORAL DAILY
Qty: 90 TABLET | Refills: 0 | Status: SHIPPED | OUTPATIENT
Start: 2023-06-06

## 2023-06-06 RX ORDER — ESZOPICLONE 3 MG/1
3 TABLET, FILM COATED ORAL NIGHTLY
Qty: 30 TABLET | Refills: 5 | Status: SHIPPED | OUTPATIENT
Start: 2023-06-30

## 2023-06-06 RX ORDER — SERTRALINE HYDROCHLORIDE 100 MG/1
100 TABLET, FILM COATED ORAL DAILY
Qty: 90 TABLET | Refills: 1 | Status: SHIPPED | OUTPATIENT
Start: 2023-06-06

## 2023-06-06 SDOH — SOCIAL STABILITY - SOCIAL INSECURITY: DISSAPEARANCE AND DEATH OF FAMILY MEMBER: Z63.4

## 2023-06-06 NOTE — TREATMENT PLAN
Multi-Disciplinary Problems (from Behavioral Health Treatment Plan)      Active Problems       Problem: Anxiety  Start Date: 06/06/23      Problem Details: The patient self-scales this problem as a 5 with 10 being the worst.          Goal Priority Start Date Expected End Date End Date    Patient will develop and implement behavioral and cognitive strategies to reduce anxiety and irrational fears. -- 06/06/23 -- --    Goal Details: Progress toward goal:  Not appropriate to rate progress toward goal since this is the initial treatment plan.          Goal Intervention Frequency Start Date End Date    Help patient explore past emotional issues in relation to present anxiety. Q Month 06/06/23 --    Intervention Details: Duration of treatment until until remission of symptoms.          Goal Intervention Frequency Start Date End Date    Help patient develop an awareness of their cognitive and physical responses to anxiety. Q Month 06/06/23 --    Intervention Details: Duration of treatment until until remission of symptoms.                  Problem: Depression  Start Date: 06/06/23      Problem Details: The patient self-scales this problem as a 2 with 10 being the worst.          Goal Priority Start Date Expected End Date End Date    Patient will demonstrate the ability to initiate new constructive life skills outside of sessions on a consistent basis. -- 06/06/23 -- --    Goal Details: Progress toward goal:  Not appropriate to rate progress toward goal since this is the initial treatment plan.          Goal Intervention Frequency Start Date End Date    Assist patient in setting attainable activities of daily living goals. PRN 06/06/23 --      Goal Intervention Frequency Start Date End Date    Provide education about depression Q Month 06/06/23 --    Intervention Details: Duration of treatment until until remission of symptoms.          Goal Intervention Frequency Start Date End Date    Assist patient in developing healthy  coping strategies. Q Month 06/06/23 --    Intervention Details: Duration of treatment until until remission of symptoms.                  Problem: Post Traumatic Stress  Start Date: 06/06/23      Problem Details: The patient self-scales this problem as a 3 with 10 being the worst.          Goal Priority Start Date Expected End Date End Date    Patient will process and move through trauma in a way that improves self regard and the patients ability to function optimally in the world around them. -- 06/06/23 -- --    Goal Details: Progress toward goal:  Not appropriate to rate progress toward goal since this is the initial treatment plan.          Goal Intervention Frequency Start Date End Date    Assist patient in identifying ways that trauma has negatively impacted their view of themselves and the world. Q Month 06/06/23 --    Intervention Details: Duration of treatment until until remission of symptoms.          Goal Intervention Frequency Start Date End Date    Process trauma in the context of the safe session environment. Q Month 06/06/23 --    Intervention Details: Duration of treatment until until remission of symptoms.          Goal Intervention Frequency Start Date End Date    Develop a plan of behavior changes that will reduce the stress of the trauma. Q Month 06/06/23 --    Intervention Details: Duration of treatment until until remission of symptoms.                          Reviewed By       Izzy Narayan MD 06/06/23 9095                     I have discussed and reviewed this treatment plan with the patient.

## 2023-06-12 ENCOUNTER — LAB (OUTPATIENT)
Dept: LAB | Facility: HOSPITAL | Age: 58
End: 2023-06-12
Payer: OTHER GOVERNMENT

## 2023-06-12 DIAGNOSIS — E11.9 TYPE 2 DIABETES MELLITUS WITHOUT COMPLICATION, WITHOUT LONG-TERM CURRENT USE OF INSULIN: ICD-10-CM

## 2023-06-12 DIAGNOSIS — F51.05 INSOMNIA DUE TO MENTAL CONDITION: ICD-10-CM

## 2023-06-12 DIAGNOSIS — E03.9 HYPOTHYROIDISM, UNSPECIFIED TYPE: ICD-10-CM

## 2023-06-12 LAB
ALBUMIN SERPL-MCNC: 4.5 G/DL (ref 3.5–5.2)
ALBUMIN UR-MCNC: <1.2 MG/DL
ALBUMIN/GLOB SERPL: 1.7 G/DL
ALP SERPL-CCNC: 96 U/L (ref 39–117)
ALT SERPL W P-5'-P-CCNC: 14 U/L (ref 1–33)
AMPHET+METHAMPHET UR QL: NEGATIVE
ANION GAP SERPL CALCULATED.3IONS-SCNC: 10.7 MMOL/L (ref 5–15)
AST SERPL-CCNC: 13 U/L (ref 1–32)
BARBITURATES UR QL SCN: NEGATIVE
BENZODIAZ UR QL SCN: NEGATIVE
BILIRUB SERPL-MCNC: 0.3 MG/DL (ref 0–1.2)
BUN SERPL-MCNC: 19 MG/DL (ref 6–20)
BUN/CREAT SERPL: 19 (ref 7–25)
CALCIUM SPEC-SCNC: 9.8 MG/DL (ref 8.6–10.5)
CANNABINOIDS SERPL QL: NEGATIVE
CHLORIDE SERPL-SCNC: 104 MMOL/L (ref 98–107)
CHOLEST SERPL-MCNC: 211 MG/DL (ref 0–200)
CO2 SERPL-SCNC: 25.3 MMOL/L (ref 22–29)
COCAINE UR QL: NEGATIVE
CREAT SERPL-MCNC: 1 MG/DL (ref 0.57–1)
CREAT UR-MCNC: 134.2 MG/DL
DEPRECATED RDW RBC AUTO: 41.9 FL (ref 37–54)
EGFRCR SERPLBLD CKD-EPI 2021: 66.3 ML/MIN/1.73
ERYTHROCYTE [DISTWIDTH] IN BLOOD BY AUTOMATED COUNT: 14.3 % (ref 12.3–15.4)
FENTANYL UR-MCNC: NEGATIVE NG/ML
GLOBULIN UR ELPH-MCNC: 2.6 GM/DL
GLUCOSE SERPL-MCNC: 169 MG/DL (ref 65–99)
HBA1C MFR BLD: 7.8 % (ref 4.8–5.6)
HCT VFR BLD AUTO: 41.6 % (ref 34–46.6)
HDLC SERPL-MCNC: 46 MG/DL (ref 40–60)
HGB BLD-MCNC: 13.3 G/DL (ref 12–15.9)
LDLC SERPL CALC-MCNC: 131 MG/DL (ref 0–100)
LDLC/HDLC SERPL: 2.75 {RATIO}
MCH RBC QN AUTO: 25.6 PG (ref 26.6–33)
MCHC RBC AUTO-ENTMCNC: 32 G/DL (ref 31.5–35.7)
MCV RBC AUTO: 80.2 FL (ref 79–97)
METHADONE UR QL SCN: NEGATIVE
MICROALBUMIN/CREAT UR: NORMAL MG/G{CREAT}
OPIATES UR QL: NEGATIVE
OXYCODONE UR QL SCN: NEGATIVE
PLATELET # BLD AUTO: 262 10*3/MM3 (ref 140–450)
PMV BLD AUTO: 10.6 FL (ref 6–12)
POTASSIUM SERPL-SCNC: 4.6 MMOL/L (ref 3.5–5.2)
PROT SERPL-MCNC: 7.1 G/DL (ref 6–8.5)
RBC # BLD AUTO: 5.19 10*6/MM3 (ref 3.77–5.28)
SODIUM SERPL-SCNC: 140 MMOL/L (ref 136–145)
TRIGL SERPL-MCNC: 193 MG/DL (ref 0–150)
TSH SERPL DL<=0.05 MIU/L-ACNC: 4.18 UIU/ML (ref 0.27–4.2)
VLDLC SERPL-MCNC: 34 MG/DL (ref 5–40)
WBC NRBC COR # BLD: 7.16 10*3/MM3 (ref 3.4–10.8)

## 2023-06-12 PROCEDURE — 80307 DRUG TEST PRSMV CHEM ANLYZR: CPT

## 2023-06-12 PROCEDURE — 84443 ASSAY THYROID STIM HORMONE: CPT

## 2023-06-12 PROCEDURE — 82570 ASSAY OF URINE CREATININE: CPT

## 2023-06-12 PROCEDURE — 85027 COMPLETE CBC AUTOMATED: CPT

## 2023-06-12 PROCEDURE — 80053 COMPREHEN METABOLIC PANEL: CPT

## 2023-06-12 PROCEDURE — 80061 LIPID PANEL: CPT

## 2023-06-12 PROCEDURE — 36415 COLL VENOUS BLD VENIPUNCTURE: CPT

## 2023-06-12 PROCEDURE — 83036 HEMOGLOBIN GLYCOSYLATED A1C: CPT

## 2023-06-12 PROCEDURE — 82043 UR ALBUMIN QUANTITATIVE: CPT

## 2023-06-15 RX ORDER — EZETIMIBE 10 MG/1
10 TABLET ORAL DAILY
Qty: 90 TABLET | Refills: 1 | Status: SHIPPED | OUTPATIENT
Start: 2023-06-15

## 2023-07-31 RX ORDER — MONTELUKAST SODIUM 10 MG/1
10 TABLET ORAL EVERY EVENING
Qty: 30 TABLET | Refills: 3 | Status: SHIPPED | OUTPATIENT
Start: 2023-07-31

## 2023-08-09 ENCOUNTER — OFFICE VISIT (OUTPATIENT)
Dept: FAMILY MEDICINE CLINIC | Facility: CLINIC | Age: 58
End: 2023-08-09
Payer: OTHER GOVERNMENT

## 2023-08-09 VITALS
BODY MASS INDEX: 34.72 KG/M2 | HEIGHT: 66 IN | TEMPERATURE: 98.4 F | WEIGHT: 216 LBS | SYSTOLIC BLOOD PRESSURE: 118 MMHG | DIASTOLIC BLOOD PRESSURE: 74 MMHG | OXYGEN SATURATION: 97 % | HEART RATE: 95 BPM

## 2023-08-09 DIAGNOSIS — M62.838 MUSCLE SPASM: ICD-10-CM

## 2023-08-09 DIAGNOSIS — U07.1 COVID-19: ICD-10-CM

## 2023-08-09 DIAGNOSIS — Z20.822 EXPOSURE TO COVID-19 VIRUS: Primary | ICD-10-CM

## 2023-08-09 LAB
EXPIRATION DATE: ABNORMAL
FLUAV AG UPPER RESP QL IA.RAPID: NOT DETECTED
FLUBV AG UPPER RESP QL IA.RAPID: NOT DETECTED
INTERNAL CONTROL: ABNORMAL
Lab: ABNORMAL
SARS-COV-2 AG UPPER RESP QL IA.RAPID: DETECTED

## 2023-08-09 RX ORDER — CYCLOBENZAPRINE HCL 10 MG
10 TABLET ORAL 2 TIMES DAILY PRN
Qty: 30 TABLET | Refills: 0 | Status: SHIPPED | OUTPATIENT
Start: 2023-08-09

## 2023-08-09 RX ORDER — PREDNISONE 50 MG/1
50 TABLET ORAL DAILY
Qty: 5 TABLET | Refills: 0 | Status: SHIPPED | OUTPATIENT
Start: 2023-08-09 | End: 2023-08-14

## 2023-08-09 NOTE — PROGRESS NOTES
"Chief Complaint  Neck Pain    Subjective         Clementina Rios presents to [facility]  Patient presents to the office today with complaints of general fatigue and congestion.  Patient states that she was exposed to COVID by coworkers.  Patient also states that she has been having pain in her shoulders neck and is interrupting her sleeping.  She states that this has been going on for a while and seems to be getting worse.  She denies any fevers at this time.  She denies any nausea vomiting or diarrhea.    Neck Pain        Objective     Vitals:    08/09/23 1018   BP: 118/74   BP Location: Right arm   Patient Position: Sitting   Cuff Size: Adult   Pulse: 95   Temp: 98.4 øF (36.9 øC)   TempSrc: Temporal   SpO2: 97%   Weight: 98 kg (216 lb)   Height: 167.6 cm (66\")      Body mass index is 34.86 kg/mý.             Physical Exam  Vitals reviewed.   Constitutional:       Appearance: Normal appearance.   HENT:      Right Ear: Tympanic membrane, ear canal and external ear normal.      Left Ear: Tympanic membrane, ear canal and external ear normal.      Nose: Nose normal.   Cardiovascular:      Rate and Rhythm: Normal rate and regular rhythm.      Pulses: Normal pulses.      Heart sounds: Normal heart sounds, S1 normal and S2 normal. No murmur heard.  Pulmonary:      Effort: Pulmonary effort is normal. No respiratory distress.      Breath sounds: Normal breath sounds.   Musculoskeletal:      Cervical back: Tenderness present.   Skin:     General: Skin is warm and dry.   Neurological:      Mental Status: She is alert and oriented to person, place, and time.   Psychiatric:         Attention and Perception: Attention normal.         Mood and Affect: Mood normal.         Behavior: Behavior normal.        Result Review :   The following data was reviewed by: ABILIO Dickerson on 08/09/2023:      Procedures    Assessment and Plan   Diagnoses and all orders for this visit:    1. Exposure to " COVID-19 virus (Primary)  -     POCT SARS-CoV-2 Antigen ZARIA + Flu    2. COVID-19  -     predniSONE (DELTASONE) 50 MG tablet; Take 1 tablet by mouth Daily for 5 days.  Dispense: 5 tablet; Refill: 0    3. Muscle spasm  -     cyclobenzaprine (FLEXERIL) 10 MG tablet; Take 1 tablet by mouth 2 (Two) Times a Day As Needed for Muscle Spasms.  Dispense: 30 tablet; Refill: 0    Patient does state that she has been having neck pains that radiate down her shoulders to her thoracic back.  I did discuss utilizing the muscle relaxer to reduce the discomfort.  Explained to the patient that she would also need to be off work for the rest of the week secondary to her COVID diagnosis.  I did encourage her to rest drink plenty of fluids and to take ibuprofen or Tylenol for fevers and general body aches.      Follow Up   Return if symptoms worsen or fail to improve.  Patient was given instructions and counseling regarding her condition or for health maintenance advice. Please see specific information pulled into the AVS if appropriate.

## 2023-08-09 NOTE — LETTER
August 9, 2023     Patient: Clementina Rios   YOB: 1965   Date of Visit: 8/9/2023       To Whom It May Concern:    It is my medical opinion that Clementina Rios may return to work on 08/14/2023.         Sincerely,        ABILIO Dickerson    CC: No Recipients

## 2023-08-28 ENCOUNTER — OFFICE VISIT (OUTPATIENT)
Dept: PSYCHIATRY | Facility: CLINIC | Age: 58
End: 2023-08-28
Payer: OTHER GOVERNMENT

## 2023-08-28 VITALS
SYSTOLIC BLOOD PRESSURE: 125 MMHG | WEIGHT: 217.6 LBS | DIASTOLIC BLOOD PRESSURE: 63 MMHG | HEART RATE: 83 BPM | BODY MASS INDEX: 34.97 KG/M2 | HEIGHT: 66 IN

## 2023-08-28 DIAGNOSIS — F41.1 GENERALIZED ANXIETY DISORDER: ICD-10-CM

## 2023-08-28 DIAGNOSIS — F51.05 INSOMNIA DUE TO MENTAL CONDITION: ICD-10-CM

## 2023-08-28 DIAGNOSIS — F33.1 MAJOR DEPRESSIVE DISORDER, RECURRENT EPISODE, MODERATE: Primary | ICD-10-CM

## 2023-08-28 DIAGNOSIS — F43.10 POST TRAUMATIC STRESS DISORDER (PTSD): ICD-10-CM

## 2023-08-28 DIAGNOSIS — Z63.4 BEREAVEMENT: ICD-10-CM

## 2023-08-28 PROBLEM — N20.0 RENAL CALCULUS OR STONE: Status: ACTIVE | Noted: 2023-08-28

## 2023-08-28 PROBLEM — N81.10 FEMALE BLADDER PROLAPSE: Status: ACTIVE | Noted: 2023-08-28

## 2023-08-28 PROBLEM — J30.2 SEASONAL ALLERGIC RHINITIS: Status: ACTIVE | Noted: 2023-08-28

## 2023-08-28 PROBLEM — J34.9 SINUS TROUBLE: Status: ACTIVE | Noted: 2023-08-28

## 2023-08-28 PROBLEM — E78.5 HLD (HYPERLIPIDEMIA): Status: ACTIVE | Noted: 2023-08-28

## 2023-08-28 PROBLEM — J01.90 SINUSITIS, ACUTE: Status: ACTIVE | Noted: 2023-08-28

## 2023-08-28 PROBLEM — K64.9 HEMORRHOIDS: Status: ACTIVE | Noted: 2023-08-28

## 2023-08-28 PROBLEM — N39.3 SUI (STRESS URINARY INCONTINENCE, FEMALE): Status: ACTIVE | Noted: 2023-08-28

## 2023-08-28 PROBLEM — S09.90XA HEAD INJURY: Status: ACTIVE | Noted: 2023-08-28

## 2023-08-28 PROBLEM — R61 NIGHT SWEAT: Status: ACTIVE | Noted: 2023-08-28

## 2023-08-28 PROBLEM — I10 HBP (HIGH BLOOD PRESSURE): Status: ACTIVE | Noted: 2023-08-28

## 2023-08-28 SDOH — SOCIAL STABILITY - SOCIAL INSECURITY: DISSAPEARANCE AND DEATH OF FAMILY MEMBER: Z63.4
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
## 2023-08-28 NOTE — PATIENT INSTRUCTIONS
1.  Please return to clinic at your next scheduled visit.  Contact the clinic ([phone]) at least 24 hours prior in the event you need to cancel.  2.  Do no harm to yourself or others.    3.  Avoid alcohol and drugs.    4.  Take all medications as prescribed.  Please contact the clinic with any concerns. If you are in need of medication refills, please call the clinic at [phone].    5. Should you want to get in touch with your provider, Dr. Izzy Narayan, please utilize [facility] or contact the office ([phone]), and staff will be able to page Dr. Narayan directly.  6.  In the event you have personal crisis, contact the following crisis numbers: Suicide Prevention Hotline 1-410.658.1272; ANTONY Helpline 7-630-519-ANTONY; [facility] Emergency Room [phone]; text HELLO to 807162; or 174.

## 2023-08-29 ENCOUNTER — TELEPHONE (OUTPATIENT)
Dept: PSYCHIATRY | Facility: CLINIC | Age: 58
End: 2023-08-29
Payer: OTHER GOVERNMENT

## 2023-08-29 NOTE — TELEPHONE ENCOUNTER
PT CALLED AND IS REQUESTING THE CPT CODE FOR THE LIGHT BOX SO SHE CAN SEE IF HER INSURANCE WILL COVER THIS.     Visit Diagnosis    1.Major depressive disorder, recurrent episode, moderate    ICD-10-CM- F33.1    ICD-9-CM- 296.32    I CALLED AND SPOKE TO PT AND RELAYED THE ABOVE INFORMATION.    NOTHING FURTHER IS NEEDED AT THIS TIME.

## 2023-09-12 DIAGNOSIS — E11.9 TYPE 2 DIABETES MELLITUS WITHOUT COMPLICATION, WITHOUT LONG-TERM CURRENT USE OF INSULIN: Primary | ICD-10-CM

## 2023-09-27 DIAGNOSIS — Z63.4 BEREAVEMENT: ICD-10-CM

## 2023-09-27 DIAGNOSIS — F41.1 GENERALIZED ANXIETY DISORDER: ICD-10-CM

## 2023-09-27 DIAGNOSIS — F33.1 MAJOR DEPRESSIVE DISORDER, RECURRENT EPISODE, MODERATE: ICD-10-CM

## 2023-09-27 DIAGNOSIS — E11.9 TYPE 2 DIABETES MELLITUS WITHOUT COMPLICATION, WITHOUT LONG-TERM CURRENT USE OF INSULIN: ICD-10-CM

## 2023-09-27 DIAGNOSIS — F43.10 POST TRAUMATIC STRESS DISORDER (PTSD): ICD-10-CM

## 2023-09-27 DIAGNOSIS — F51.05 INSOMNIA DUE TO MENTAL CONDITION: ICD-10-CM

## 2023-09-27 SDOH — SOCIAL STABILITY - SOCIAL INSECURITY: DISSAPEARANCE AND DEATH OF FAMILY MEMBER: Z63.4

## 2023-09-28 ENCOUNTER — TELEPHONE (OUTPATIENT)
Dept: FAMILY MEDICINE CLINIC | Facility: CLINIC | Age: 58
End: 2023-09-28
Payer: OTHER GOVERNMENT

## 2023-09-28 RX ORDER — METFORMIN HYDROCHLORIDE 500 MG/1
500 TABLET, EXTENDED RELEASE ORAL 2 TIMES DAILY
Qty: 180 TABLET | Refills: 1 | Status: SHIPPED | OUTPATIENT
Start: 2023-09-28

## 2023-09-28 RX ORDER — SERTRALINE HYDROCHLORIDE 25 MG/1
25 TABLET, FILM COATED ORAL DAILY
Qty: 30 TABLET | Refills: 0 | Status: SHIPPED | OUTPATIENT
Start: 2023-09-28

## 2023-09-28 NOTE — TELEPHONE ENCOUNTER
Patient had a fall about a week ago and is having pain under right breast. Not sure if she had pulled a muscle or not. Offered patient Daniel next available appointment. Patient is wanting to get in next week if possible. Let her know he is out of office on Thursday but can send a message back.

## 2023-09-28 NOTE — TELEPHONE ENCOUNTER
sertraline (Zoloft) 25 MG tablet (06/06/2023)     Pt has upcoming appt  Appointment with Izzy Narayan MD (10/09/2023)     Medication order pended.

## 2023-09-30 DIAGNOSIS — F41.1 GENERALIZED ANXIETY DISORDER: ICD-10-CM

## 2023-09-30 DIAGNOSIS — F51.05 INSOMNIA DUE TO MENTAL CONDITION: ICD-10-CM

## 2023-09-30 DIAGNOSIS — F33.1 MAJOR DEPRESSIVE DISORDER, RECURRENT EPISODE, MODERATE: ICD-10-CM

## 2023-09-30 DIAGNOSIS — F43.10 POST TRAUMATIC STRESS DISORDER (PTSD): ICD-10-CM

## 2023-09-30 DIAGNOSIS — I73.9 CLAUDICATION: ICD-10-CM

## 2023-09-30 DIAGNOSIS — Z63.4 BEREAVEMENT: ICD-10-CM

## 2023-09-30 SDOH — SOCIAL STABILITY - SOCIAL INSECURITY: DISSAPEARANCE AND DEATH OF FAMILY MEMBER: Z63.4

## 2023-10-02 ENCOUNTER — LAB (OUTPATIENT)
Dept: LAB | Facility: HOSPITAL | Age: 58
End: 2023-10-02
Payer: OTHER GOVERNMENT

## 2023-10-02 DIAGNOSIS — E11.9 TYPE 2 DIABETES MELLITUS WITHOUT COMPLICATION, WITHOUT LONG-TERM CURRENT USE OF INSULIN: ICD-10-CM

## 2023-10-02 LAB
ALBUMIN SERPL-MCNC: 4.7 G/DL (ref 3.5–5.2)
ALBUMIN/GLOB SERPL: 1.9 G/DL
ALP SERPL-CCNC: 122 U/L (ref 39–117)
ALT SERPL W P-5'-P-CCNC: 22 U/L (ref 1–33)
ANION GAP SERPL CALCULATED.3IONS-SCNC: 12 MMOL/L (ref 5–15)
AST SERPL-CCNC: 19 U/L (ref 1–32)
BILIRUB SERPL-MCNC: 0.3 MG/DL (ref 0–1.2)
BUN SERPL-MCNC: 11 MG/DL (ref 6–20)
BUN/CREAT SERPL: 12.4 (ref 7–25)
CALCIUM SPEC-SCNC: 9.7 MG/DL (ref 8.6–10.5)
CHLORIDE SERPL-SCNC: 102 MMOL/L (ref 98–107)
CHOLEST SERPL-MCNC: 221 MG/DL (ref 0–200)
CO2 SERPL-SCNC: 26 MMOL/L (ref 22–29)
CREAT SERPL-MCNC: 0.89 MG/DL (ref 0.57–1)
DEPRECATED RDW RBC AUTO: 45.7 FL (ref 37–54)
EGFRCR SERPLBLD CKD-EPI 2021: 76.2 ML/MIN/1.73
ERYTHROCYTE [DISTWIDTH] IN BLOOD BY AUTOMATED COUNT: 15.7 % (ref 12.3–15.4)
GLOBULIN UR ELPH-MCNC: 2.5 GM/DL
GLUCOSE SERPL-MCNC: 184 MG/DL (ref 65–99)
HBA1C MFR BLD: 8.6 % (ref 4.8–5.6)
HCT VFR BLD AUTO: 43.1 % (ref 34–46.6)
HDLC SERPL-MCNC: 52 MG/DL (ref 40–60)
HGB BLD-MCNC: 14 G/DL (ref 12–15.9)
LDLC SERPL CALC-MCNC: 129 MG/DL (ref 0–100)
LDLC/HDLC SERPL: 2.37 {RATIO}
MCH RBC QN AUTO: 26.3 PG (ref 26.6–33)
MCHC RBC AUTO-ENTMCNC: 32.5 G/DL (ref 31.5–35.7)
MCV RBC AUTO: 81 FL (ref 79–97)
PLATELET # BLD AUTO: 250 10*3/MM3 (ref 140–450)
PMV BLD AUTO: 10.6 FL (ref 6–12)
POTASSIUM SERPL-SCNC: 4.9 MMOL/L (ref 3.5–5.2)
PROT SERPL-MCNC: 7.2 G/DL (ref 6–8.5)
RBC # BLD AUTO: 5.32 10*6/MM3 (ref 3.77–5.28)
SODIUM SERPL-SCNC: 140 MMOL/L (ref 136–145)
T4 FREE SERPL-MCNC: 0.91 NG/DL (ref 0.93–1.7)
TRIGL SERPL-MCNC: 229 MG/DL (ref 0–150)
TSH SERPL DL<=0.05 MIU/L-ACNC: 6.62 UIU/ML (ref 0.27–4.2)
VLDLC SERPL-MCNC: 40 MG/DL (ref 5–40)
WBC NRBC COR # BLD: 7.84 10*3/MM3 (ref 3.4–10.8)

## 2023-10-02 PROCEDURE — 80061 LIPID PANEL: CPT

## 2023-10-02 PROCEDURE — 85027 COMPLETE CBC AUTOMATED: CPT

## 2023-10-02 PROCEDURE — 84443 ASSAY THYROID STIM HORMONE: CPT

## 2023-10-02 PROCEDURE — 84439 ASSAY OF FREE THYROXINE: CPT

## 2023-10-02 PROCEDURE — 80053 COMPREHEN METABOLIC PANEL: CPT

## 2023-10-02 PROCEDURE — 83036 HEMOGLOBIN GLYCOSYLATED A1C: CPT

## 2023-10-02 PROCEDURE — 36415 COLL VENOUS BLD VENIPUNCTURE: CPT

## 2023-10-02 RX ORDER — CILOSTAZOL 100 MG/1
100 TABLET ORAL 2 TIMES DAILY
Qty: 180 TABLET | Refills: 1 | Status: SHIPPED | OUTPATIENT
Start: 2023-10-02

## 2023-10-02 RX ORDER — SERTRALINE HYDROCHLORIDE 100 MG/1
100 TABLET, FILM COATED ORAL DAILY
Qty: 90 TABLET | Refills: 1 | OUTPATIENT
Start: 2023-10-02

## 2023-10-02 NOTE — TELEPHONE ENCOUNTER
sertraline (ZOLOFT) 100 MG tablet (06/06/2023)     Medication last ordered 06/06/2023 for 90 day supply with 1 refill on file, therefore refill may not be appropriate at this time. Order refused and pended.

## 2023-10-03 ENCOUNTER — OFFICE VISIT (OUTPATIENT)
Dept: FAMILY MEDICINE CLINIC | Facility: CLINIC | Age: 58
End: 2023-10-03
Payer: OTHER GOVERNMENT

## 2023-10-03 VITALS
DIASTOLIC BLOOD PRESSURE: 88 MMHG | HEIGHT: 66 IN | SYSTOLIC BLOOD PRESSURE: 132 MMHG | WEIGHT: 216.6 LBS | TEMPERATURE: 97.1 F | HEART RATE: 108 BPM | BODY MASS INDEX: 34.81 KG/M2 | OXYGEN SATURATION: 98 %

## 2023-10-03 DIAGNOSIS — R61 NIGHT SWEAT: ICD-10-CM

## 2023-10-03 DIAGNOSIS — E78.5 HYPERLIPIDEMIA, UNSPECIFIED HYPERLIPIDEMIA TYPE: ICD-10-CM

## 2023-10-03 DIAGNOSIS — I49.9 IRREGULAR HEART BEAT: ICD-10-CM

## 2023-10-03 DIAGNOSIS — E11.65 TYPE 2 DIABETES MELLITUS WITH HYPERGLYCEMIA, WITHOUT LONG-TERM CURRENT USE OF INSULIN: ICD-10-CM

## 2023-10-03 DIAGNOSIS — I49.8 VENTRICULAR TRIGEMINY: ICD-10-CM

## 2023-10-03 DIAGNOSIS — E03.9 HYPOTHYROIDISM, UNSPECIFIED TYPE: Primary | ICD-10-CM

## 2023-10-03 PROCEDURE — 99214 OFFICE O/P EST MOD 30 MIN: CPT | Performed by: NURSE PRACTITIONER

## 2023-10-03 RX ORDER — LEVOTHYROXINE SODIUM 25 MCG
25 TABLET ORAL
Qty: 90 TABLET | Refills: 1 | Status: SHIPPED | OUTPATIENT
Start: 2023-10-03

## 2023-10-03 NOTE — PROGRESS NOTES
"Chief Complaint  Leg Pain (Had a fall 2 weeks ago), Muscle Pain (Under right breast), and Night Sweats (Waist down X6 months)    Subjective         Clementina Rios presents to [facility]  Presents to the office today for a follow-up regarding her diabetes, hyperlipidemia.  I did review recent lab work with her including her TSH of 6.6.  Patient states that she was unable to tolerate generic levothyroxine.  I did explain that we would try branded Synthroid to see if she was tolerate this better.  Patient's A1c is 8.6% at this time.  She states that she did tolerate Trulicity in the past.  I did state that I want her to check with [facility] to see if they had Mounjaro on the formulary as I would prefer her to start this medication.  Blood pressure is 132/88.  She denies any chest pain shortness of breath at this time.  Did have a fall 2 weeks ago at home but states that the right rib pain as well as her leg pain has improved.  She states that the fall was because of her rushing and tripping.    Leg Pain     Muscle Pain    Night Sweats       Objective     Vitals:    10/03/23 1504   BP: 132/88   Pulse: 108   Temp: 97.1 °F (36.2 °C)   SpO2: 98%   Weight: 98.2 kg (216 lb 9.6 oz)   Height: 167.6 cm (66\")      Body mass index is 34.96 kg/m².             Physical Exam  Vitals reviewed.   Constitutional:       Appearance: Normal appearance.   HENT:      Right Ear: Hearing, tympanic membrane, ear canal and external ear normal. A middle ear effusion is present.      Left Ear: Hearing, tympanic membrane, ear canal and external ear normal.   Cardiovascular:      Rate and Rhythm: Normal rate. Rhythm irregular.      Pulses: Normal pulses.      Heart sounds: Normal heart sounds, S1 normal and S2 normal. No murmur heard.     Comments: Irregular heartbeat noted on auscultation  Pulmonary:      Effort: Pulmonary effort is normal. No respiratory distress.      Breath sounds: Normal breath sounds. "   Musculoskeletal:      Comments: multiple areas of ecchymosis noted to the left anterior lower leg secondary to the fall   Skin:     General: Skin is warm and dry.   Neurological:      Mental Status: She is alert and oriented to person, place, and time.   Psychiatric:         Attention and Perception: Attention normal.         Mood and Affect: Mood normal.         Behavior: Behavior normal.        Result Review :   The following data was reviewed by: ABILIO Dickerson on 10/03/2023:        ECG 12 Lead    Date/Time: 10/3/2023 3:36 PM  Performed by: Daniel Mares APRN  Authorized by: Daniel Mares APRN   Rhythm: sinus rhythm  Ectopy: trigeminy  Rate: normal    Clinical impression: abnormal EKG        Assessment and Plan   Diagnoses and all orders for this visit:    1. Hypothyroidism, unspecified type (Primary)  -     Synthroid 25 MCG tablet; Take 1 tablet by mouth Every Morning.  Dispense: 90 tablet; Refill: 1  -     CBC (No Diff); Future  -     Comprehensive Metabolic Panel; Future  -     Lipid Panel; Future  -     TSH+Free T4; Future  -     Hemoglobin A1c; Future  -     Microalbumin / Creatinine Urine Ratio - Urine, Clean Catch; Future    2. Night sweat    3. Type 2 diabetes mellitus with hyperglycemia, without long-term current use of insulin  -     CBC (No Diff); Future  -     Comprehensive Metabolic Panel; Future  -     Lipid Panel; Future  -     TSH+Free T4; Future  -     Hemoglobin A1c; Future  -     Microalbumin / Creatinine Urine Ratio - Urine, Clean Catch; Future    4. Hyperlipidemia, unspecified hyperlipidemia type  -     CBC (No Diff); Future  -     Comprehensive Metabolic Panel; Future  -     Lipid Panel; Future  -     TSH+Free T4; Future  -     Hemoglobin A1c; Future  -     Microalbumin / Creatinine Urine Ratio - Urine, Clean Catch; Future    5. Irregular heart beat  -     ECG 12 Lead    6. Ventricular trigeminy  -     Ambulatory Referral to Cardiology          Follow Up   Return in about 3 months  (around 1/3/2024) for Recheck.  Patient was given instructions and counseling regarding her condition or for health maintenance advice. Please see specific information pulled into the AVS if appropriate.

## 2023-10-28 DIAGNOSIS — F41.1 GENERALIZED ANXIETY DISORDER: ICD-10-CM

## 2023-10-28 DIAGNOSIS — F43.10 POST TRAUMATIC STRESS DISORDER (PTSD): ICD-10-CM

## 2023-10-28 DIAGNOSIS — F51.05 INSOMNIA DUE TO MENTAL CONDITION: ICD-10-CM

## 2023-10-28 DIAGNOSIS — Z63.4 BEREAVEMENT: ICD-10-CM

## 2023-10-28 DIAGNOSIS — F33.1 MAJOR DEPRESSIVE DISORDER, RECURRENT EPISODE, MODERATE: ICD-10-CM

## 2023-10-28 SDOH — SOCIAL STABILITY - SOCIAL INSECURITY: DISSAPEARANCE AND DEATH OF FAMILY MEMBER: Z63.4

## 2023-10-30 NOTE — TELEPHONE ENCOUNTER
Patient Comment: Can you request a 90 day refill? This helps me from having to drive to [place] every 30 days.     NEXT APPT WITH PROVIDER  NONE IN EPIC    MOST RECENT APPT CANCELLED   Appointment with Izzy Narayan MD (10/09/2023)     LAST SEEN   Office Visit with Izzy Narayan MD (08/28/2023)

## 2023-10-30 NOTE — TELEPHONE ENCOUNTER
ATTEMPTED TO CONTACT PT(PATIENT)      NO ANSWER      LEFT VOICEMAIL WITH INSTRUCTIONS TO RETURN CALL TO OFFICE AT [phone]

## 2023-10-31 RX ORDER — SERTRALINE HYDROCHLORIDE 25 MG/1
25 TABLET, FILM COATED ORAL DAILY
Qty: 90 TABLET | Refills: 1 | Status: SHIPPED | OUTPATIENT
Start: 2023-10-31

## 2023-10-31 RX ORDER — SERTRALINE HYDROCHLORIDE 100 MG/1
100 TABLET, FILM COATED ORAL DAILY
Qty: 90 TABLET | Refills: 1 | Status: SHIPPED | OUTPATIENT
Start: 2023-10-31

## 2023-10-31 NOTE — TELEPHONE ENCOUNTER
PT SAYS THAT SHE CANNOT AFFORD HER COPAY FOR AS OFTEN AS SHE IS NEEDING TO BE SEEN HERE.  SHE SAYS THAT THE COPAY ON HER APPOINTMENTS WITH [facility] HAVE ALMOST DOUBLED

## 2023-10-31 NOTE — TELEPHONE ENCOUNTER
She can spread her appnts out if that is more affordable, does she want to reschedule her 11/16 appnt for a later date?

## 2023-10-31 NOTE — TELEPHONE ENCOUNTER
ATTEMPTED TO CONTACT PT(PATIENT)      NO ANSWER      LEFT VOICEMAIL WITH INSTRUCTIONS TO RETURN CALL TO OFFICE AT [phone]

## 2023-10-31 NOTE — TELEPHONE ENCOUNTER
I CALLED AND SPOKE TO PT.    INFORMED PT OF MEDICATIONS BEING SENT INTO PHARMACY AND ALSO OFFERED TO SCHEDULE HER FOLLOW UP OUT FURTHER.    PT STATED AT THIS TIME SHE WOULD LIKE TO KEEP HER APPT ON 11/16/23; PT EXPRESSED GRATITUDE FOR SENDING IN HER MEDICATION AS 90 DAYS AS WELL.    NOTHING FURTHER IS NEEDED AT THIS TIME.    ROUTING TO UPDATE PROVIDER.

## 2023-11-26 DIAGNOSIS — M62.838 MUSCLE SPASM: ICD-10-CM

## 2023-11-27 RX ORDER — CYCLOBENZAPRINE HCL 10 MG
10 TABLET ORAL 2 TIMES DAILY PRN
Qty: 30 TABLET | Refills: 0 | Status: SHIPPED | OUTPATIENT
Start: 2023-11-27

## 2023-11-27 RX ORDER — MONTELUKAST SODIUM 10 MG/1
10 TABLET ORAL EVERY EVENING
Qty: 30 TABLET | Refills: 3 | Status: SHIPPED | OUTPATIENT
Start: 2023-11-27

## 2023-12-13 PROBLEM — I49.8 VENTRICULAR TRIGEMINY: Status: ACTIVE | Noted: 2023-12-13

## 2023-12-13 NOTE — PROGRESS NOTES
Chief Complaint  Establish Care, ventricular trigeminy, Hypertension, and Hyperlipidemia      History of Present Illness  Clementina Rios presents to [facility] CARDIOLOGY  Patient is a 58-year-old female with a prior history of dyslipidemia and previous hypertension that resolved with weight loss who was noted to have an irregular heartbeat on exam and EKG revealed ventricular trigeminy.  She reports mild dyspnea on exertion symptoms as well as chronic lower extremity edema left greater than right.  She has not been having any PND orthopnea issues.  She denies any anginal chest discomfort problems.  No symptomatic tachycardia or syncopal episodes    Past Medical History:   Diagnosis Date    Abnormal ECG     Deep vein thrombosis     Diabetes mellitus     HBP (high blood pressure)     Head injury     Hemorrhoids     History of cystocele     HLD (hyperlipidemia)     Night sweat     Renal calculus or stone     Seasonal allergies     Sinus trouble     Sinusitis, acute     DIONI (stress urinary incontinence, female)          Current Outpatient Medications:     cilostazol (PLETAL) 100 MG tablet, Take 1 tablet by mouth 2 (Two) Times a Day., Disp: 180 tablet, Rfl: 1    cyclobenzaprine (FLEXERIL) 10 MG tablet, Take 1 tablet by mouth 2 (Two) Times a Day As Needed for Muscle Spasms., Disp: 30 tablet, Rfl: 0    empagliflozin (Jardiance) 25 MG tablet tablet, Take 1 tablet by mouth Daily., Disp: 90 tablet, Rfl: 1    eszopiclone (LUNESTA) 3 MG tablet, Take 1 tablet by mouth Every Night., Disp: 30 tablet, Rfl: 5    ezetimibe (ZETIA) 10 MG tablet, Take 1 tablet by mouth Daily., Disp: 90 tablet, Rfl: 1    fluticasone (FLONASE) 50 MCG/ACT nasal spray, 2 sprays into the nostril(s) as directed by provider Daily As Needed for Rhinitis for up to 90 days., Disp: 47.4 mL, Rfl: 3    gabapentin (NEURONTIN) 300 MG capsule, Take 1 capsule by mouth 3 (Three) Times a Day., Disp: 270 capsule, Rfl: 1    metFORMIN ER  "(GLUCOPHAGE-XR) 500 MG 24 hr tablet, Take 1 tablet by mouth 2 (Two) Times a Day., Disp: 180 tablet, Rfl: 1    montelukast (SINGULAIR) 10 MG tablet, Take 1 tablet by mouth Every Evening., Disp: 30 tablet, Rfl: 3    ondansetron (Zofran) 4 MG tablet, Take 1 tablet by mouth Every 8 (Eight) Hours As Needed for Nausea or Vomiting., Disp: 30 tablet, Rfl: 0    sertraline (ZOLOFT) 100 MG tablet, Take 1 tablet by mouth Daily., Disp: 90 tablet, Rfl: 1    sertraline (Zoloft) 25 MG tablet, Take 1 tablet by mouth Daily., Disp: 90 tablet, Rfl: 1    Synthroid 25 MCG tablet, Take 1 tablet by mouth Every Morning., Disp: 90 tablet, Rfl: 1    There are no discontinued medications.  Allergies   Allergen Reactions    Amitriptyline Unknown - Low Severity    Codeine Rash        Social History     Tobacco Use    Smoking status: Never    Smokeless tobacco: Never   Vaping Use    Vaping Use: Never used   Substance Use Topics    Alcohol use: Never    Drug use: Never       Family History   Problem Relation Age of Onset    Depression Mother     Heart disease Mother     Hypertension Mother     Diabetes Father     Heart failure Father     Paranoid behavior Sister     Drug abuse Sister     Paranoid behavior Brother     Drug abuse Brother     No Known Problems Maternal Aunt     No Known Problems Paternal Aunt     No Known Problems Maternal Uncle     No Known Problems Paternal Uncle     No Known Problems Maternal Grandfather     No Known Problems Maternal Grandmother     No Known Problems Paternal Grandfather     Dementia Paternal Grandmother     No Known Problems Cousin     Other Daughter         Renal Calculus     ADD / ADHD Other     Alcohol abuse Neg Hx     Anxiety disorder Neg Hx     Bipolar disorder Neg Hx     OCD Neg Hx     Schizophrenia Neg Hx     Seizures Neg Hx     Self-Injurious Behavior  Neg Hx     Suicide Attempts Neg Hx         Objective     /67   Pulse 65   Ht 167.6 cm (66\")   Wt 98 kg (216 lb)   BMI 34.86 kg/m²       Physical " "Exam    General Appearance:   no acute distress  Alert and oriented x3  HENT:   lips not cyanotic  Atraumatic  Neck:  No jvd   supple  Respiratory:  no respiratory distress  normal breath sounds  no rales  Cardiovascular:  Regular rate and rhythm  no S3, no S4   no murmur  no rub  Extremities  No cyanosis  lower extremity edema: none    Skin:   warm, dry  No rashes    Result Review :     No results found for: \"PROBNP\"  CMP          2/14/2023    07:56 6/12/2023    07:54 10/2/2023    07:50   CMP   Glucose 139  169  184    BUN 13  19  11    Creatinine 0.90  1.00  0.89    EGFR 75.7  66.3  76.2    Sodium 138  140  140    Potassium 4.6  4.6  4.9    Chloride 105  104  102    Calcium 9.3  9.8  9.7    Total Protein 6.7  7.1  7.2    Albumin 4.3  4.5  4.7    Globulin 2.4  2.6  2.5    Total Bilirubin 0.3  0.3  0.3    Alkaline Phosphatase 103  96  122    AST (SGOT) 14  13  19    ALT (SGPT) 16  14  22    Albumin/Globulin Ratio 1.8  1.7  1.9    BUN/Creatinine Ratio 14.4  19.0  12.4    Anion Gap 6.6  10.7  12.0      CBC w/diff          2/14/2023    07:56 6/12/2023    07:54 10/2/2023    07:50   CBC w/Diff   WBC 7.35  7.16  7.84    RBC 5.08  5.19  5.32    Hemoglobin 13.2  13.3  14.0    Hematocrit 40.5  41.6  43.1    MCV 79.7  80.2  81.0    MCH 26.0  25.6  26.3    MCHC 32.6  32.0  32.5    RDW 14.7  14.3  15.7    Platelets 247  262  250       Lab Results   Component Value Date    TSH 6.620 (H) 10/02/2023      Lab Results   Component Value Date    FREET4 0.91 (L) 10/02/2023      No results found for: \"DDIMERQUANT\"  Magnesium   Date Value Ref Range Status   07/08/2022 1.9 1.6 - 2.6 mg/dL Final      No results found for: \"DIGOXIN\"   No results found for: \"TROPONINT\"   No results found for: \"POCTROP\"(       Lipid Panel          2/14/2023    07:56 6/12/2023    07:54 10/2/2023    07:50   Lipid Panel   Total Cholesterol 201  211  221    Triglycerides 143  193  229    HDL Cholesterol 54  46  52    VLDL Cholesterol 25  34  40    LDL Cholesterol "  122  131  129    LDL/HDL Ratio 2.19  2.75  2.37             No results found for this or any previous visit.           Results for orders placed during the hospital encounter of 09/23/21    Duplex Venous Lower Extremity - Bilateral CAR    Interpretation Summary  · There was superficial venous valvular incompetence noted in the right saphenofemoral junction.  · Normal bilateral lower extremity venous duplex scan.    The 10-year ASCVD risk score (Donald DRAKE, et al., 2019) is: 5.9%    Values used to calculate the score:      Age: 58 years      Sex: Female      Is Non- : No      Diabetic: Yes      Tobacco smoker: No      Systolic Blood Pressure: 142 mmHg      Is BP treated: No      HDL Cholesterol: 52 mg/dL      Total Cholesterol: 221 mg/dL     Diagnoses and all orders for this visit:    1. Ventricular trigeminy (Primary)  Assessment & Plan:  Patient with intermittent ventricular trigeminy and some mild dyspnea on exertion symptoms does have risk factors for vascular disease unclear if she has any lower extremity peripheral vas disease but she currently is on cilostazol.  Recommended from a cardiac standpoint checking both a echocardiogram as well as a Holter monitor to assess for PVC burden as well as other possible arrhythmias.  In addition given her risk factors for CAD we will get a noninvasive stress test to make sure no evidence of ischemic heart issues.  For the time being since patient is not having any symptomatic problems and her home blood pressures have been fairly well-controlled would not recommend any additional medications    Orders:  -     Adult Transthoracic Echo Complete W/ Cont if Necessary Per Protocol; Future  -     Stress Test With Myocardial Perfusion One Day; Future  -     Holter Monitor - 48 Hour; Future    2. Dyslipidemia    3. Prehypertension  Assessment & Plan:  4Patient with elevated blood pressure in office today mildly previous 140 and prehypertensive range  encouraged her to keep a home blood pressure log for review to see if her blood pressure needed any pharmacologic treatment possibly in the future  Counseled patient on  low-sodium diet of less than 2 g  Aerobic activity 30 minutes a day 5 times a week  Weight loss                  Follow Up     No follow-ups on file.          Patient was given instructions and counseling regarding her condition or for health maintenance advice. Please see specific information pulled into the AVS if appropriate.     Blood

## 2023-12-14 ENCOUNTER — OFFICE VISIT (OUTPATIENT)
Dept: CARDIOLOGY | Facility: CLINIC | Age: 58
End: 2023-12-14
Payer: OTHER GOVERNMENT

## 2023-12-14 VITALS
BODY MASS INDEX: 34.72 KG/M2 | WEIGHT: 216 LBS | SYSTOLIC BLOOD PRESSURE: 142 MMHG | HEART RATE: 65 BPM | HEIGHT: 66 IN | DIASTOLIC BLOOD PRESSURE: 67 MMHG

## 2023-12-14 DIAGNOSIS — R03.0 PREHYPERTENSION: ICD-10-CM

## 2023-12-14 DIAGNOSIS — I49.8 VENTRICULAR TRIGEMINY: Primary | ICD-10-CM

## 2023-12-14 DIAGNOSIS — E78.5 DYSLIPIDEMIA: ICD-10-CM

## 2023-12-14 PROCEDURE — 99204 OFFICE O/P NEW MOD 45 MIN: CPT | Performed by: INTERNAL MEDICINE

## 2023-12-14 NOTE — ASSESSMENT & PLAN NOTE
Patient with intermittent ventricular trigeminy and some mild dyspnea on exertion symptoms does have risk factors for vascular disease unclear if she has any lower extremity peripheral vas disease but she currently is on cilostazol.  Recommended from a cardiac standpoint checking both a echocardiogram as well as a Holter monitor to assess for PVC burden as well as other possible arrhythmias.  In addition given her risk factors for CAD we will get a noninvasive stress test to make sure no evidence of ischemic heart issues.  For the time being since patient is not having any symptomatic problems and her home blood pressures have been fairly well-controlled would not recommend any additional medications

## 2023-12-14 NOTE — ASSESSMENT & PLAN NOTE
4Patient with elevated blood pressure in office today mildly previous 140 and prehypertensive range encouraged her to keep a home blood pressure log for review to see if her blood pressure needed any pharmacologic treatment possibly in the future  Counseled patient on  low-sodium diet of less than 2 g  Aerobic activity 30 minutes a day 5 times a week  Weight loss

## 2023-12-25 DIAGNOSIS — F51.05 INSOMNIA DUE TO MENTAL CONDITION: ICD-10-CM

## 2023-12-26 RX ORDER — EZETIMIBE 10 MG/1
10 TABLET ORAL DAILY
Qty: 90 TABLET | Refills: 1 | Status: SHIPPED | OUTPATIENT
Start: 2023-12-26

## 2023-12-26 RX ORDER — ESZOPICLONE 3 MG/1
3 TABLET, FILM COATED ORAL NIGHTLY
Qty: 30 TABLET | Refills: 5 | OUTPATIENT
Start: 2023-12-26

## 2023-12-26 NOTE — TELEPHONE ENCOUNTER
REFILL REQUEST FOR LUNESTA 3 MG TABLETS.  eszopiclone (LUNESTA) 3 MG tablet (06/30/2023)     FOLLOW UP APPT- NONE IN [facility].  PT LAST SEEN ON 08/28/23.  PT CANCELED LAST APPT'S ON 10/09/2023 AND 11/16/23.    I CALLED PT TO SCHEDULE FOLLOW UP.   PT DID NOT ANSWER.    I LEFT A VOICEMAIL REQUESTING THAT PT CALL OUR OFFICE BACK.

## 2023-12-29 ENCOUNTER — LAB (OUTPATIENT)
Dept: LAB | Facility: HOSPITAL | Age: 58
End: 2023-12-29
Payer: OTHER GOVERNMENT

## 2023-12-29 ENCOUNTER — TELEMEDICINE (OUTPATIENT)
Dept: PSYCHIATRY | Facility: CLINIC | Age: 58
End: 2023-12-29
Payer: OTHER GOVERNMENT

## 2023-12-29 ENCOUNTER — TELEPHONE (OUTPATIENT)
Dept: PSYCHIATRY | Facility: CLINIC | Age: 58
End: 2023-12-29

## 2023-12-29 DIAGNOSIS — F51.05 INSOMNIA DUE TO MENTAL CONDITION: Primary | ICD-10-CM

## 2023-12-29 DIAGNOSIS — F41.1 GENERALIZED ANXIETY DISORDER: ICD-10-CM

## 2023-12-29 DIAGNOSIS — Z63.4 BEREAVEMENT: ICD-10-CM

## 2023-12-29 DIAGNOSIS — E78.5 HYPERLIPIDEMIA, UNSPECIFIED HYPERLIPIDEMIA TYPE: ICD-10-CM

## 2023-12-29 DIAGNOSIS — F33.1 MAJOR DEPRESSIVE DISORDER, RECURRENT EPISODE, MODERATE: ICD-10-CM

## 2023-12-29 DIAGNOSIS — F43.10 POST TRAUMATIC STRESS DISORDER (PTSD): ICD-10-CM

## 2023-12-29 DIAGNOSIS — E11.65 TYPE 2 DIABETES MELLITUS WITH HYPERGLYCEMIA, WITHOUT LONG-TERM CURRENT USE OF INSULIN: ICD-10-CM

## 2023-12-29 DIAGNOSIS — E03.9 HYPOTHYROIDISM, UNSPECIFIED TYPE: ICD-10-CM

## 2023-12-29 LAB
ALBUMIN SERPL-MCNC: 4.2 G/DL (ref 3.5–5.2)
ALBUMIN/GLOB SERPL: 1.7 G/DL
ALP SERPL-CCNC: 104 U/L (ref 39–117)
ALT SERPL W P-5'-P-CCNC: 24 U/L (ref 1–33)
ANION GAP SERPL CALCULATED.3IONS-SCNC: 13.2 MMOL/L (ref 5–15)
AST SERPL-CCNC: 20 U/L (ref 1–32)
BILIRUB SERPL-MCNC: 0.3 MG/DL (ref 0–1.2)
BUN SERPL-MCNC: 9 MG/DL (ref 6–20)
BUN/CREAT SERPL: 11.8 (ref 7–25)
CALCIUM SPEC-SCNC: 9 MG/DL (ref 8.6–10.5)
CHLORIDE SERPL-SCNC: 106 MMOL/L (ref 98–107)
CHOLEST SERPL-MCNC: 226 MG/DL (ref 0–200)
CO2 SERPL-SCNC: 20.8 MMOL/L (ref 22–29)
CREAT SERPL-MCNC: 0.76 MG/DL (ref 0.57–1)
DEPRECATED RDW RBC AUTO: 41 FL (ref 37–54)
EGFRCR SERPLBLD CKD-EPI 2021: 92.1 ML/MIN/1.73
ERYTHROCYTE [DISTWIDTH] IN BLOOD BY AUTOMATED COUNT: 14.6 % (ref 12.3–15.4)
GLOBULIN UR ELPH-MCNC: 2.5 GM/DL
GLUCOSE SERPL-MCNC: 183 MG/DL (ref 65–99)
HBA1C MFR BLD: 8.6 % (ref 4.8–5.6)
HCT VFR BLD AUTO: 40.5 % (ref 34–46.6)
HDLC SERPL-MCNC: 44 MG/DL (ref 40–60)
HGB BLD-MCNC: 12.9 G/DL (ref 12–15.9)
LDLC SERPL CALC-MCNC: 135 MG/DL (ref 0–100)
LDLC/HDLC SERPL: 2.95 {RATIO}
MCH RBC QN AUTO: 25.1 PG (ref 26.6–33)
MCHC RBC AUTO-ENTMCNC: 31.9 G/DL (ref 31.5–35.7)
MCV RBC AUTO: 78.9 FL (ref 79–97)
PLATELET # BLD AUTO: 255 10*3/MM3 (ref 140–450)
PMV BLD AUTO: 10.3 FL (ref 6–12)
POTASSIUM SERPL-SCNC: 3.7 MMOL/L (ref 3.5–5.2)
PROT SERPL-MCNC: 6.7 G/DL (ref 6–8.5)
RBC # BLD AUTO: 5.13 10*6/MM3 (ref 3.77–5.28)
SODIUM SERPL-SCNC: 140 MMOL/L (ref 136–145)
T4 FREE SERPL-MCNC: 0.99 NG/DL (ref 0.93–1.7)
TRIGL SERPL-MCNC: 260 MG/DL (ref 0–150)
TSH SERPL DL<=0.05 MIU/L-ACNC: 4.6 UIU/ML (ref 0.27–4.2)
VLDLC SERPL-MCNC: 47 MG/DL (ref 5–40)
WBC NRBC COR # BLD AUTO: 7.68 10*3/MM3 (ref 3.4–10.8)

## 2023-12-29 PROCEDURE — 85027 COMPLETE CBC AUTOMATED: CPT

## 2023-12-29 PROCEDURE — 83036 HEMOGLOBIN GLYCOSYLATED A1C: CPT

## 2023-12-29 PROCEDURE — 84443 ASSAY THYROID STIM HORMONE: CPT

## 2023-12-29 PROCEDURE — 80061 LIPID PANEL: CPT

## 2023-12-29 PROCEDURE — 36415 COLL VENOUS BLD VENIPUNCTURE: CPT

## 2023-12-29 PROCEDURE — 84439 ASSAY OF FREE THYROXINE: CPT

## 2023-12-29 PROCEDURE — 80053 COMPREHEN METABOLIC PANEL: CPT

## 2023-12-29 RX ORDER — ESZOPICLONE 3 MG/1
3 TABLET, FILM COATED ORAL NIGHTLY
Qty: 30 TABLET | Refills: 5 | Status: SHIPPED | OUTPATIENT
Start: 2023-12-29

## 2023-12-29 SDOH — SOCIAL STABILITY - SOCIAL INSECURITY: DISSAPEARANCE AND DEATH OF FAMILY MEMBER: Z63.4

## 2023-12-29 NOTE — TELEPHONE ENCOUNTER
"Pt called to ask why her medication had not been sent in yet.  I explained that the provider would fill it at her appt today pt said , \"Well, that will be pushing it... okay.  I will just get it at my appointment, then\"  No further actions necessary at this time  "

## 2023-12-29 NOTE — PATIENT INSTRUCTIONS
1.  Please return to clinic at your next scheduled visit.  Contact the clinic ([phone]) at least 24 hours prior in the event you need to cancel.  2.  Do no harm to yourself or others.    3.  Avoid alcohol and drugs.    4.  Take all medications as prescribed.  Please contact the clinic with any concerns. If you are in need of medication refills, please call the clinic at [phone].    5. Should you want to get in touch with your provider, Dr. Izzy Narayan, please utilize [facility] [facility] or contact the office ([phone]), and staff will be able to page Dr. Narayan directly.  6.  In the event you have personal crisis, contact the following crisis numbers: Suicide Prevention Hotline 1-241.284.7198; [facility] Helpline 7-835-132-ANTONY; [facility] Emergency Room [phone]; text HELLO to 139710; or 822.
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
## 2024-01-02 DIAGNOSIS — E03.9 HYPOTHYROIDISM, UNSPECIFIED TYPE: ICD-10-CM

## 2024-01-02 DIAGNOSIS — E11.9 TYPE 2 DIABETES MELLITUS WITHOUT COMPLICATION, WITHOUT LONG-TERM CURRENT USE OF INSULIN: ICD-10-CM

## 2024-01-02 RX ORDER — METFORMIN HYDROCHLORIDE 500 MG/1
500 TABLET, EXTENDED RELEASE ORAL 2 TIMES DAILY
Qty: 180 TABLET | Refills: 1 | Status: SHIPPED | OUTPATIENT
Start: 2024-01-02

## 2024-01-02 RX ORDER — LEVOTHYROXINE SODIUM 50 MCG
50 TABLET ORAL DAILY
Qty: 90 TABLET | Refills: 1 | Status: SHIPPED | OUTPATIENT
Start: 2024-01-02

## 2024-01-02 RX ORDER — DULAGLUTIDE 1.5 MG/.5ML
1.5 INJECTION, SOLUTION SUBCUTANEOUS WEEKLY
Qty: 6 ML | Refills: 1 | Status: SHIPPED | OUTPATIENT
Start: 2024-01-02

## 2024-01-03 DIAGNOSIS — G62.9 NEUROPATHY: ICD-10-CM

## 2024-01-03 RX ORDER — GABAPENTIN 300 MG/1
300 CAPSULE ORAL 3 TIMES DAILY
Qty: 270 CAPSULE | Refills: 1 | Status: SHIPPED | OUTPATIENT
Start: 2024-01-03 | End: 2024-01-03 | Stop reason: SDUPTHER

## 2024-01-03 RX ORDER — GABAPENTIN 300 MG/1
300 CAPSULE ORAL 3 TIMES DAILY
Qty: 270 CAPSULE | Refills: 1 | Status: SHIPPED | OUTPATIENT
Start: 2024-01-03

## 2024-01-15 ENCOUNTER — TELEPHONE (OUTPATIENT)
Dept: CARDIOLOGY | Facility: CLINIC | Age: 59
End: 2024-01-15
Payer: OTHER GOVERNMENT

## 2024-01-15 NOTE — TELEPHONE ENCOUNTER
Call patient discussed results of her Holter monitor given the frequency of her PVCs recommend the addition of Toprol 25 mg once a day she is scheduled to get stress test and echocardiogram we will await those results we will schedule follow-up next 2 months.  Next

## 2024-01-24 ENCOUNTER — PATIENT MESSAGE (OUTPATIENT)
Dept: FAMILY MEDICINE CLINIC | Facility: CLINIC | Age: 59
End: 2024-01-24
Payer: OTHER GOVERNMENT

## 2024-01-24 ENCOUNTER — TELEPHONE (OUTPATIENT)
Dept: FAMILY MEDICINE CLINIC | Facility: CLINIC | Age: 59
End: 2024-01-24
Payer: OTHER GOVERNMENT

## 2024-01-24 DIAGNOSIS — E11.65 TYPE 2 DIABETES MELLITUS WITH HYPERGLYCEMIA, WITHOUT LONG-TERM CURRENT USE OF INSULIN: Primary | ICD-10-CM

## 2024-01-24 NOTE — TELEPHONE ENCOUNTER
----- Message from Clementina Rios sent at 1/24/2024  8:55 AM EST -----  Regarding: [facility]  Contact: [phone]  Good morning. I am not sure where the break down happened however [facility] informed me yesterday there is not a script for Monjuaro/ or a PA.   I have been checking my account and had not seen it so I called them about it. They said there is no record of this request.   Can you look into this?.

## 2024-02-02 ENCOUNTER — TELEPHONE (OUTPATIENT)
Dept: PSYCHIATRY | Facility: CLINIC | Age: 59
End: 2024-02-02
Payer: OTHER GOVERNMENT

## 2024-02-02 NOTE — TELEPHONE ENCOUNTER
ATTEMPTED TO CONTACT PT(PATIENT) PER OVERDUE LAB ORDERS PROTOCOL     PT(PATIENT) HAS OVERDUE LAB ORDERS   Urine Drug Screen - Urine, Clean Catch (12/26/2023 09:59)     PT(PATIENT) ADVISED TO COMPLETE ORDER VIA OUTPATIENT LAB SERVICES AT EITHER     [facility] OUTPATIENT LAB   [address]   [phone]     [facility] OUTPATIENT LAB   [address]   CLOSES AT 5PM   PHONE      NO ANSWER      LEFT VOICEMAIL WITH INSTRUCTIONS TO RETURN CALL TO OFFICE AT [phone]

## 2024-02-12 DIAGNOSIS — E11.65 TYPE 2 DIABETES MELLITUS WITH HYPERGLYCEMIA, WITHOUT LONG-TERM CURRENT USE OF INSULIN: ICD-10-CM

## 2024-02-21 ENCOUNTER — HOSPITAL ENCOUNTER (OUTPATIENT)
Dept: NUCLEAR MEDICINE | Facility: HOSPITAL | Age: 59
Discharge: HOME | End: 2024-02-21
Payer: OTHER GOVERNMENT

## 2024-02-21 ENCOUNTER — HOSPITAL ENCOUNTER (OUTPATIENT)
Dept: CARDIOLOGY | Facility: HOSPITAL | Age: 59
Discharge: HOME | End: 2024-02-21
Payer: OTHER GOVERNMENT

## 2024-02-21 DIAGNOSIS — I49.8 VENTRICULAR TRIGEMINY: ICD-10-CM

## 2024-02-21 PROCEDURE — 93017 CV STRESS TEST TRACING ONLY: CPT

## 2024-02-21 PROCEDURE — 78452 HT MUSCLE IMAGE SPECT MULT: CPT

## 2024-02-21 PROCEDURE — A9502 TC99M TETROFOSMIN: HCPCS | Performed by: INTERNAL MEDICINE

## 2024-02-21 PROCEDURE — 93306 TTE W/DOPPLER COMPLETE: CPT

## 2024-02-21 PROCEDURE — 25010000002 REGADENOSON 0.4 MG/5ML SOLUTION: Performed by: INTERNAL MEDICINE

## 2024-02-21 PROCEDURE — 0 TECHNETIUM TETROFOSMIN KIT: Performed by: INTERNAL MEDICINE

## 2024-02-21 RX ORDER — REGADENOSON 0.08 MG/ML
0.4 INJECTION, SOLUTION INTRAVENOUS
Status: COMPLETED | OUTPATIENT
Start: 2024-02-21 | End: 2024-02-21

## 2024-02-21 RX ADMIN — TETROFOSMIN 1 DOSE: 1.38 INJECTION, POWDER, LYOPHILIZED, FOR SOLUTION INTRAVENOUS at 09:55

## 2024-02-21 RX ADMIN — REGADENOSON 0.4 MG: 0.08 INJECTION, SOLUTION INTRAVENOUS at 09:55

## 2024-02-21 RX ADMIN — TETROFOSMIN 1 DOSE: 1.38 INJECTION, POWDER, LYOPHILIZED, FOR SOLUTION INTRAVENOUS at 08:45

## 2024-02-23 ENCOUNTER — TELEPHONE (OUTPATIENT)
Dept: CARDIOLOGY | Facility: CLINIC | Age: 59
End: 2024-02-23
Payer: OTHER GOVERNMENT

## 2024-02-23 LAB
AORTIC DIMENSIONLESS INDEX: 0.5 (DI)
BH CV ECHO MEAS - ACS: 1.9 CM
BH CV ECHO MEAS - AO MAX PG: 11.7 MMHG
BH CV ECHO MEAS - AO MEAN PG: 6 MMHG
BH CV ECHO MEAS - AO ROOT DIAM: 2.6 CM
BH CV ECHO MEAS - AO V2 MAX: 171 CM/SEC
BH CV ECHO MEAS - AO V2 VTI: 38.5 CM
BH CV ECHO MEAS - AVA(I,D): 1.73 CM2
BH CV ECHO MEAS - EDV(CUBED): 157.5 ML
BH CV ECHO MEAS - EDV(MOD-SP2): 86 ML
BH CV ECHO MEAS - EDV(MOD-SP4): 94.4 ML
BH CV ECHO MEAS - EF(MOD-BP): 36.4 %
BH CV ECHO MEAS - EF(MOD-SP2): 29.2 %
BH CV ECHO MEAS - EF(MOD-SP4): 39.3 %
BH CV ECHO MEAS - ESV(CUBED): 85.2 ML
BH CV ECHO MEAS - ESV(MOD-SP2): 60.9 ML
BH CV ECHO MEAS - ESV(MOD-SP4): 57.3 ML
BH CV ECHO MEAS - FS: 18.5 %
BH CV ECHO MEAS - IVS/LVPW: 1 CM
BH CV ECHO MEAS - IVSD: 1 CM
BH CV ECHO MEAS - LA DIMENSION: 4.4 CM
BH CV ECHO MEAS - LAT PEAK E' VEL: 8.8 CM/SEC
BH CV ECHO MEAS - LV DIASTOLIC VOL/BSA (35-75): 46.2 CM2
BH CV ECHO MEAS - LV MASS(C)D: 206.7 GRAMS
BH CV ECHO MEAS - LV MAX PG: 3.1 MMHG
BH CV ECHO MEAS - LV MEAN PG: 2 MMHG
BH CV ECHO MEAS - LV SYSTOLIC VOL/BSA (12-30): 28.1 CM2
BH CV ECHO MEAS - LV V1 MAX: 88.6 CM/SEC
BH CV ECHO MEAS - LV V1 VTI: 19.2 CM
BH CV ECHO MEAS - LVIDD: 5.4 CM
BH CV ECHO MEAS - LVIDS: 4.4 CM
BH CV ECHO MEAS - LVOT AREA: 3.5 CM2
BH CV ECHO MEAS - LVOT DIAM: 2.1 CM
BH CV ECHO MEAS - LVPWD: 1 CM
BH CV ECHO MEAS - MED PEAK E' VEL: 8.5 CM/SEC
BH CV ECHO MEAS - MR MAX PG: 109.8 MMHG
BH CV ECHO MEAS - MR MAX VEL: 524 CM/SEC
BH CV ECHO MEAS - MR MEAN PG: 71 MMHG
BH CV ECHO MEAS - MR MEAN VEL: 400 CM/SEC
BH CV ECHO MEAS - MR VTI: 200 CM
BH CV ECHO MEAS - MV A MAX VEL: 99.6 CM/SEC
BH CV ECHO MEAS - MV DEC SLOPE: 1391 CM/SEC2
BH CV ECHO MEAS - MV DEC TIME: 0.15 SEC
BH CV ECHO MEAS - MV E MAX VEL: 74.9 CM/SEC
BH CV ECHO MEAS - MV E/A: 0.75
BH CV ECHO MEAS - MV MEAN PG: 4 MMHG
BH CV ECHO MEAS - MV P1/2T: 30.3 MSEC
BH CV ECHO MEAS - MV V2 VTI: 30.9 CM
BH CV ECHO MEAS - MVA(P1/2T): 7.3 CM2
BH CV ECHO MEAS - MVA(VTI): 2.15 CM2
BH CV ECHO MEAS - PA V2 MAX: 91.4 CM/SEC
BH CV ECHO MEAS - PULM A REVS DUR: 0.13 SEC
BH CV ECHO MEAS - PULM A REVS VEL: 29.9 CM/SEC
BH CV ECHO MEAS - PULM DIAS VEL: 56 CM/SEC
BH CV ECHO MEAS - PULM S/D: 1.01
BH CV ECHO MEAS - PULM SYS VEL: 56.5 CM/SEC
BH CV ECHO MEAS - QP/QS: 1.05
BH CV ECHO MEAS - RV MAX PG: 1.98 MMHG
BH CV ECHO MEAS - RV V1 MAX: 70.3 CM/SEC
BH CV ECHO MEAS - RV V1 VTI: 20.2 CM
BH CV ECHO MEAS - RVDD: 2.6 CM
BH CV ECHO MEAS - RVOT DIAM: 2.1 CM
BH CV ECHO MEAS - SI(MOD-SP2): 12.3 ML/M2
BH CV ECHO MEAS - SI(MOD-SP4): 18.2 ML/M2
BH CV ECHO MEAS - SV(LVOT): 66.5 ML
BH CV ECHO MEAS - SV(MOD-SP2): 25.1 ML
BH CV ECHO MEAS - SV(MOD-SP4): 37.1 ML
BH CV ECHO MEAS - SV(RVOT): 70 ML
BH CV ECHO MEAS - TAPSE (>1.6): 1.92 CM
BH CV ECHO MEASUREMENTS AVERAGE E/E' RATIO: 8.66
BH CV IMMEDIATE POST TECH DATA BLOOD PRESSURE: NORMAL MMHG
BH CV IMMEDIATE POST TECH DATA HEART RATE: 128 BPM
BH CV IMMEDIATE POST TECH DATA OXYGEN SATS: 98 %
BH CV REST NUCLEAR ISOTOPE DOSE: 9.5 MCI
BH CV SIX MINUTE RECOVERY TECH DATA BLOOD PRESSURE: NORMAL
BH CV SIX MINUTE RECOVERY TECH DATA HEART RATE: 85 BPM
BH CV SIX MINUTE RECOVERY TECH DATA OXYGEN SATURATION: 98 %
BH CV STRESS BP STAGE 1: NORMAL
BH CV STRESS DURATION MIN STAGE 1: 3
BH CV STRESS DURATION MIN STAGE 2: 3
BH CV STRESS DURATION SEC STAGE 1: 0
BH CV STRESS DURATION SEC STAGE 2: 0
BH CV STRESS GRADE STAGE 1: 10
BH CV STRESS GRADE STAGE 2: 10
BH CV STRESS HR STAGE 1: 114
BH CV STRESS HR STAGE 2: 111
BH CV STRESS METS STAGE 1: 5
BH CV STRESS METS STAGE 2: 7.5
BH CV STRESS NUCLEAR ISOTOPE DOSE: 36.8 MCI
BH CV STRESS O2 STAGE 1: 99
BH CV STRESS O2 STAGE 2: 99
BH CV STRESS PROTOCOL 1: NORMAL
BH CV STRESS PROTOCOL 2: NORMAL
BH CV STRESS RECOVERY BP: NORMAL MMHG
BH CV STRESS RECOVERY HR: 85 BPM
BH CV STRESS RECOVERY O2: 98 %
BH CV STRESS SPEED STAGE 1: 1.7
BH CV STRESS SPEED STAGE 2: 1.5
BH CV STRESS STAGE 1: 1
BH CV STRESS STAGE 2: 2
BH CV THREE MINUTE POST TECH DATA BLOOD PRESSURE: NORMAL MMHG
BH CV THREE MINUTE POST TECH DATA HEART RATE: 92 BPM
BH CV THREE MINUTE POST TECH DATA OXYGEN SATURATION: 98 %
BH CV XLRA - RV BASE: 2.8 CM
BH CV XLRA - RV LENGTH: 8.5 CM
BH CV XLRA - RV MID: 2.3 CM
BH CV XLRA - TDI S': 11.7 CM/SEC
IVRT: 85 MS
LEFT ATRIUM VOLUME INDEX: 28.7 ML/M2
LV EF 2D ECHO EST: 50 %
LV EF NUC BP: 38 %
MAXIMAL PREDICTED HEART RATE: 164 BPM
PERCENT MAX PREDICTED HR: 78.05 %
STRESS BASELINE BP: NORMAL MMHG
STRESS BASELINE HR: 71 BPM
STRESS O2 SAT REST: 99 %
STRESS PERCENT HR: 92 %
STRESS POST O2 SAT PEAK: 99 %
STRESS POST PEAK BP: NORMAL MMHG
STRESS POST PEAK HR: 128 BPM
STRESS TARGET HR: 139 BPM

## 2024-02-23 NOTE — TELEPHONE ENCOUNTER
Called and discussed results with patient her echocardiogram shows a low normal EF and borderline enlargement stress which did not show any office ischemia but she did have increased ventricular ectopy during the infusion portion she has been placed on Toprol and been feeling well with that we will have her follow back up in March to discuss further workup possibly with cardiac MRI and may be repeat monitoring to see how her PVC counts

## 2024-03-06 ENCOUNTER — OFFICE VISIT (OUTPATIENT)
Dept: CARDIOLOGY | Facility: CLINIC | Age: 59
End: 2024-03-06
Payer: OTHER GOVERNMENT

## 2024-03-06 ENCOUNTER — LAB (OUTPATIENT)
Dept: LAB | Facility: HOSPITAL | Age: 59
End: 2024-03-06
Payer: OTHER GOVERNMENT

## 2024-03-06 VITALS
BODY MASS INDEX: 34.07 KG/M2 | DIASTOLIC BLOOD PRESSURE: 71 MMHG | HEART RATE: 70 BPM | WEIGHT: 212 LBS | HEIGHT: 66 IN | SYSTOLIC BLOOD PRESSURE: 133 MMHG

## 2024-03-06 DIAGNOSIS — I49.8 VENTRICULAR TRIGEMINY: ICD-10-CM

## 2024-03-06 DIAGNOSIS — I49.8 VENTRICULAR TRIGEMINY: Primary | ICD-10-CM

## 2024-03-06 DIAGNOSIS — R06.09 DOE (DYSPNEA ON EXERTION): ICD-10-CM

## 2024-03-06 PROCEDURE — 99214 OFFICE O/P EST MOD 30 MIN: CPT | Performed by: INTERNAL MEDICINE

## 2024-03-06 PROCEDURE — 83880 ASSAY OF NATRIURETIC PEPTIDE: CPT

## 2024-03-06 PROCEDURE — 36415 COLL VENOUS BLD VENIPUNCTURE: CPT

## 2024-03-06 NOTE — PROGRESS NOTES
Chief Complaint  Hypertension and Follow-up      History of Present Illness  Clementina Rios presents to [facility] CARDIOLOGY  ***  Past Medical History:   Diagnosis Date    Abnormal ECG     Deep vein thrombosis     Diabetes mellitus     HBP (high blood pressure)     Head injury     Hemorrhoids     History of cystocele     HLD (hyperlipidemia)     Night sweat     Renal calculus or stone     Seasonal allergies     Sinus trouble     Sinusitis, acute     DIONI (stress urinary incontinence, female)          Current Outpatient Medications:     cilostazol (PLETAL) 100 MG tablet, Take 1 tablet by mouth 2 (Two) Times a Day., Disp: 180 tablet, Rfl: 1    cyclobenzaprine (FLEXERIL) 10 MG tablet, Take 1 tablet by mouth 2 (Two) Times a Day As Needed for Muscle Spasms., Disp: 30 tablet, Rfl: 0    empagliflozin (Jardiance) 25 MG tablet tablet, Take 1 tablet by mouth Daily., Disp: 90 tablet, Rfl: 1    eszopiclone (LUNESTA) 3 MG tablet, Take 1 tablet by mouth Every Night., Disp: 30 tablet, Rfl: 5    ezetimibe (ZETIA) 10 MG tablet, Take 1 tablet by mouth Daily., Disp: 90 tablet, Rfl: 1    fluticasone (FLONASE) 50 MCG/ACT nasal spray, 2 sprays into the nostril(s) as directed by provider Daily As Needed for Rhinitis for up to 90 days., Disp: 47.4 mL, Rfl: 3    gabapentin (NEURONTIN) 300 MG capsule, Take 1 capsule by mouth 3 (Three) Times a Day., Disp: 270 capsule, Rfl: 1    metFORMIN ER (GLUCOPHAGE-XR) 500 MG 24 hr tablet, Take 1 tablet by mouth 2 (Two) Times a Day., Disp: 180 tablet, Rfl: 1    metoprolol succinate XL (TOPROL-XL) 25 MG 24 hr tablet, Take 1 tablet by mouth Daily., Disp: 90 tablet, Rfl: 3    montelukast (SINGULAIR) 10 MG tablet, Take 1 tablet by mouth Every Evening., Disp: 30 tablet, Rfl: 3    ondansetron (Zofran) 4 MG tablet, Take 1 tablet by mouth Every 8 (Eight) Hours As Needed for Nausea or Vomiting., Disp: 30 tablet, Rfl: 0    sertraline (ZOLOFT) 100 MG tablet, Take 1 tablet by mouth Daily.,  "Disp: 90 tablet, Rfl: 1    sertraline (Zoloft) 25 MG tablet, Take 1 tablet by mouth Daily., Disp: 90 tablet, Rfl: 1    Synthroid 50 MCG tablet, Take 1 tablet by mouth Daily., Disp: 90 tablet, Rfl: 1    Tirzepatide (MOUNJARO) 7.5 MG/0.5ML solution pen-injector pen, Inject 0.5 mL under the skin into the appropriate area as directed 1 (One) Time Per Week., Disp: 6 mL, Rfl: 1    There are no discontinued medications.  Allergies   Allergen Reactions    Amitriptyline Unknown - Low Severity    Codeine Rash        Social History     Tobacco Use    Smoking status: Never    Smokeless tobacco: Never   Vaping Use    Vaping status: Never Used   Substance Use Topics    Alcohol use: Never    Drug use: Never       Family History   Problem Relation Age of Onset    Depression Mother     Heart disease Mother     Hypertension Mother     Diabetes Father     Heart failure Father     Paranoid behavior Sister     Drug abuse Sister     Paranoid behavior Brother     Drug abuse Brother     No Known Problems Maternal Aunt     No Known Problems Paternal Aunt     No Known Problems Maternal Uncle     No Known Problems Paternal Uncle     No Known Problems Maternal Grandfather     No Known Problems Maternal Grandmother     No Known Problems Paternal Grandfather     Dementia Paternal Grandmother     No Known Problems Cousin     Other Daughter         Renal Calculus     ADD / ADHD Other     Alcohol abuse Neg Hx     Anxiety disorder Neg Hx     Bipolar disorder Neg Hx     OCD Neg Hx     Schizophrenia Neg Hx     Seizures Neg Hx     Self-Injurious Behavior  Neg Hx     Suicide Attempts Neg Hx         Objective     /71 (BP Location: Left arm)   Pulse 70   Ht 167.6 cm (66\")   Wt 96.2 kg (212 lb)   BMI 34.22 kg/m²       Physical Exam    General Appearance:   no acute distress  Alert and oriented x3  HENT:   lips not cyanotic  Atraumatic  Neck:  No jvd   supple  Respiratory:  no respiratory distress  normal breath sounds  no " "rales  Cardiovascular:  ***  no S3, no S4   no murmur  no rub  Extremities  No cyanosis  lower extremity edema: none    Skin:   warm, dry  No rashes    Result Review :{Labs  Result Review  Imaging  Med Tab  Media :23}   {The following data was reviewed by (Optional):[id number]}  No results found for: \"PROBNP\"  CMP          6/12/2023    07:54 10/2/2023    07:50 12/29/2023    07:47   CMP   Glucose 169  184  183    BUN 19  11  9    Creatinine 1.00  0.89  0.76    EGFR 66.3  76.2  92.1    Sodium 140  140  140    Potassium 4.6  4.9  3.7    Chloride 104  102  106    Calcium 9.8  9.7  9.0    Total Protein 7.1  7.2  6.7    Albumin 4.5  4.7  4.2    Globulin 2.6  2.5  2.5    Total Bilirubin 0.3  0.3  0.3    Alkaline Phosphatase 96  122  104    AST (SGOT) 13  19  20    ALT (SGPT) 14  22  24    Albumin/Globulin Ratio 1.7  1.9  1.7    BUN/Creatinine Ratio 19.0  12.4  11.8    Anion Gap 10.7  12.0  13.2      CBC w/diff          6/12/2023    07:54 10/2/2023    07:50 12/29/2023    07:47   CBC w/Diff   WBC 7.16  7.84  7.68    RBC 5.19  5.32  5.13    Hemoglobin 13.3  14.0  12.9    Hematocrit 41.6  43.1  40.5    MCV 80.2  81.0  78.9    MCH 25.6  26.3  25.1    MCHC 32.0  32.5  31.9    RDW 14.3  15.7  14.6    Platelets 262  250  255       Lab Results   Component Value Date    TSH 4.600 (H) 12/29/2023      Lab Results   Component Value Date    FREET4 0.99 12/29/2023      No results found for: \"DDIMERQUANT\"  Magnesium   Date Value Ref Range Status   07/08/2022 1.9 1.6 - 2.6 mg/dL Final      No results found for: \"DIGOXIN\"   No results found for: \"TROPONINT\"   No results found for: \"POCTROP\"(       Lipid Panel          6/12/2023    07:54 10/2/2023    07:50 12/29/2023    07:47   Lipid Panel   Total Cholesterol 211  221  226    Triglycerides 193  229  260    HDL Cholesterol 46  52  44    VLDL Cholesterol 34  40  47    LDL Cholesterol  131  129  135    LDL/HDL Ratio 2.75  2.37  2.95      Results for orders placed during the hospital encounter of " 02/21/24    Adult Transthoracic Echo Complete W/ Cont if Necessary Per Protocol    Interpretation Summary    Left ventricular systolic function is normal. Estimated left ventricular EF = 50%    The left ventricular cavity is borderline dilated.    Left ventricular diastolic function was normal.    The left atrial cavity is mildly dilated.         No results found for this or any previous visit.     Results for orders placed during the hospital encounter of 02/21/24    Stress Test With Myocardial Perfusion One Day    Interpretation Summary    Myocardial perfusion imaging indicates a normal myocardial perfusion study with no evidence of ischemia. Impressions are consistent with a low risk study.    Left ventricular ejection fraction is moderately reduced (Calculated EF = 38%).  This may be an accurate given issues with gating due to increased ventricular ectopy    Findings consistent with a normal ECG stress test.    Patient with increased ventricular ectopy with episodes of bigeminy trigeminy as well as ventricular couplets and triplets seen with stress infusion        Results for orders placed during the hospital encounter of 09/23/21    Duplex Venous Lower Extremity - Bilateral CAR    Interpretation Summary  · There was superficial venous valvular incompetence noted in the right saphenofemoral junction.  · Normal bilateral lower extremity venous duplex scan.    The 10-year ASCVD risk score ([place] DK, et al., 2019) is: 8.3%    Values used to calculate the score:      Age: 58 years      Sex: Female      Is Non- : No      Diabetic: Yes      Tobacco smoker: No      Systolic Blood Pressure: 133 mmHg      Is BP treated: Yes      HDL Cholesterol: 44 mg/dL      Total Cholesterol: 226 mg/dL     Diagnoses and all orders for this visit:    1. Ventricular trigeminy (Primary)  Assessment & Plan:  Patient with moderate frequency PVCs and some episodes of triplets couplets and 4 beat runs infrequent in  nature her echo shows a low normal EF with borderline enlargement patient stable symptomatically since starting on Toprol 25 a day did encourage mild to moderate aerobic activity and also recommended a cardiac MRI for further structural assessment of her heart she was going to hold off until her insurance changed and June before proceeding.    Orders:  -     BNP; Future    2. MARTINEZ (dyspnea on exertion)  -     BNP; Future            Follow Up {Instructions Charge Capture  Follow-up Communications :23}    Return in about 6 months (around 9/6/2024).          Patient was given instructions and counseling regarding her condition or for health maintenance advice. Please see specific information pulled into the AVS if appropriate.

## 2024-03-06 NOTE — PROGRESS NOTES
Chief Complaint  Hypertension and Follow-up    Subjective    Patient has been doing well no issues with starting metoprolol occasionally she wakes up with some heart pounding sensations but no other complaints.  She has lower extremity edema which has been stable and mild no problems with significant change in dyspnea on exertion    Past Medical History:   Diagnosis Date    Abnormal ECG     Deep vein thrombosis     Diabetes mellitus     HBP (high blood pressure)     Head injury     Hemorrhoids     History of cystocele     HLD (hyperlipidemia)     Night sweat     Renal calculus or stone     Seasonal allergies     Sinus trouble     Sinusitis, acute     DIONI (stress urinary incontinence, female)          Current Outpatient Medications:     cilostazol (PLETAL) 100 MG tablet, Take 1 tablet by mouth 2 (Two) Times a Day., Disp: 180 tablet, Rfl: 1    cyclobenzaprine (FLEXERIL) 10 MG tablet, Take 1 tablet by mouth 2 (Two) Times a Day As Needed for Muscle Spasms., Disp: 30 tablet, Rfl: 0    empagliflozin (Jardiance) 25 MG tablet tablet, Take 1 tablet by mouth Daily., Disp: 90 tablet, Rfl: 1    eszopiclone (LUNESTA) 3 MG tablet, Take 1 tablet by mouth Every Night., Disp: 30 tablet, Rfl: 5    ezetimibe (ZETIA) 10 MG tablet, Take 1 tablet by mouth Daily., Disp: 90 tablet, Rfl: 1    fluticasone (FLONASE) 50 MCG/ACT nasal spray, 2 sprays into the nostril(s) as directed by provider Daily As Needed for Rhinitis for up to 90 days., Disp: 47.4 mL, Rfl: 3    gabapentin (NEURONTIN) 300 MG capsule, Take 1 capsule by mouth 3 (Three) Times a Day., Disp: 270 capsule, Rfl: 1    metFORMIN ER (GLUCOPHAGE-XR) 500 MG 24 hr tablet, Take 1 tablet by mouth 2 (Two) Times a Day., Disp: 180 tablet, Rfl: 1    metoprolol succinate XL (TOPROL-XL) 25 MG 24 hr tablet, Take 1 tablet by mouth Daily., Disp: 90 tablet, Rfl: 3    montelukast (SINGULAIR) 10 MG tablet, Take 1 tablet by mouth Every Evening., Disp: 30 tablet, Rfl: 3    ondansetron (Zofran) 4 MG  "tablet, Take 1 tablet by mouth Every 8 (Eight) Hours As Needed for Nausea or Vomiting., Disp: 30 tablet, Rfl: 0    sertraline (ZOLOFT) 100 MG tablet, Take 1 tablet by mouth Daily., Disp: 90 tablet, Rfl: 1    sertraline (Zoloft) 25 MG tablet, Take 1 tablet by mouth Daily., Disp: 90 tablet, Rfl: 1    Synthroid 50 MCG tablet, Take 1 tablet by mouth Daily., Disp: 90 tablet, Rfl: 1    Tirzepatide (MOUNJARO) 7.5 MG/0.5ML solution pen-injector pen, Inject 0.5 mL under the skin into the appropriate area as directed 1 (One) Time Per Week., Disp: 6 mL, Rfl: 1    There are no discontinued medications.  Allergies   Allergen Reactions    Amitriptyline Unknown - Low Severity    Codeine Rash        Social History     Tobacco Use    Smoking status: Never    Smokeless tobacco: Never   Vaping Use    Vaping status: Never Used   Substance Use Topics    Alcohol use: Never    Drug use: Never       Family History   Problem Relation Age of Onset    Depression Mother     Heart disease Mother     Hypertension Mother     Diabetes Father     Heart failure Father     Paranoid behavior Sister     Drug abuse Sister     Paranoid behavior Brother     Drug abuse Brother     No Known Problems Maternal Aunt     No Known Problems Paternal Aunt     No Known Problems Maternal Uncle     No Known Problems Paternal Uncle     No Known Problems Maternal Grandfather     No Known Problems Maternal Grandmother     No Known Problems Paternal Grandfather     Dementia Paternal Grandmother     No Known Problems Cousin     Other Daughter         Renal Calculus     ADD / ADHD Other     Alcohol abuse Neg Hx     Anxiety disorder Neg Hx     Bipolar disorder Neg Hx     OCD Neg Hx     Schizophrenia Neg Hx     Seizures Neg Hx     Self-Injurious Behavior  Neg Hx     Suicide Attempts Neg Hx         Objective     /71 (BP Location: Left arm)   Pulse 70   Ht 167.6 cm (66\")   Wt 96.2 kg (212 lb)   BMI 34.22 kg/m²       Physical Exam    General Appearance:   no acute " "distress  Alert and oriented x3  HENT:   lips not cyanotic  Atraumatic  Neck:  No jvd   supple  Respiratory:  no respiratory distress  normal breath sounds  no rales  Cardiovascular:  Regular rate and rhythm  no S3, no S4   no murmur  no rub  Extremities  No cyanosis  lower extremity edema: none    Skin:   warm, dry  No rashes      Result Review :     No results found for: \"PROBNP\"  CMP          6/12/2023    07:54 10/2/2023    07:50 12/29/2023    07:47   CMP   Glucose 169  184  183    BUN 19  11  9    Creatinine 1.00  0.89  0.76    EGFR 66.3  76.2  92.1    Sodium 140  140  140    Potassium 4.6  4.9  3.7    Chloride 104  102  106    Calcium 9.8  9.7  9.0    Total Protein 7.1  7.2  6.7    Albumin 4.5  4.7  4.2    Globulin 2.6  2.5  2.5    Total Bilirubin 0.3  0.3  0.3    Alkaline Phosphatase 96  122  104    AST (SGOT) 13  19  20    ALT (SGPT) 14  22  24    Albumin/Globulin Ratio 1.7  1.9  1.7    BUN/Creatinine Ratio 19.0  12.4  11.8    Anion Gap 10.7  12.0  13.2      CBC w/diff          6/12/2023    07:54 10/2/2023    07:50 12/29/2023    07:47   CBC w/Diff   WBC 7.16  7.84  7.68    RBC 5.19  5.32  5.13    Hemoglobin 13.3  14.0  12.9    Hematocrit 41.6  43.1  40.5    MCV 80.2  81.0  78.9    MCH 25.6  26.3  25.1    MCHC 32.0  32.5  31.9    RDW 14.3  15.7  14.6    Platelets 262  250  255       Lab Results   Component Value Date    TSH 4.600 (H) 12/29/2023      Lab Results   Component Value Date    FREET4 0.99 12/29/2023      No results found for: \"DDIMERQUANT\"  Magnesium   Date Value Ref Range Status   07/08/2022 1.9 1.6 - 2.6 mg/dL Final      No results found for: \"DIGOXIN\"   No results found for: \"TROPONINT\"        Lipid Panel          6/12/2023    07:54 10/2/2023    07:50 12/29/2023    07:47   Lipid Panel   Total Cholesterol 211  221  226    Triglycerides 193  229  260    HDL Cholesterol 46  52  44    VLDL Cholesterol 34  40  47    LDL Cholesterol  131  129  135    LDL/HDL Ratio 2.75  2.37  2.95      No results found " "for: \"POCTROP\"  Results for orders placed during the hospital encounter of 02/21/24    Stress Test With Myocardial Perfusion One Day    Interpretation Summary    Myocardial perfusion imaging indicates a normal myocardial perfusion study with no evidence of ischemia. Impressions are consistent with a low risk study.    Left ventricular ejection fraction is moderately reduced (Calculated EF = 38%).  This may be an accurate given issues with gating due to increased ventricular ectopy    Findings consistent with a normal ECG stress test.    Patient with increased ventricular ectopy with episodes of bigeminy trigeminy as well as ventricular couplets and triplets seen with stress infusion    Results for orders placed during the hospital encounter of 02/21/24    Adult Transthoracic Echo Complete W/ Cont if Necessary Per Protocol    Interpretation Summary    Left ventricular systolic function is normal. Estimated left ventricular EF = 50%    The left ventricular cavity is borderline dilated.    Left ventricular diastolic function was normal.    The left atrial cavity is mildly dilated.                 Diagnoses and all orders for this visit:    1. Ventricular trigeminy (Primary)  Assessment & Plan:  Patient with moderate frequency PVCs and some episodes of triplets couplets and 4 beat runs infrequent in nature her echo shows a low normal EF with borderline enlargement patient stable symptomatically since starting on Toprol 25 a day did encourage mild to moderate aerobic activity and also recommended a cardiac MRI for further structural assessment of her heart she was going to hold off until her insurance changed and June before proceeding.    Orders:  -     BNP; Future    2. MARTINEZ (dyspnea on exertion)  -     BNP; Future            Follow Up     Return in about 6 months (around 9/6/2024).          Patient was given instructions and counseling regarding her condition or for health maintenance advice. Please see specific " information pulled into the AVS if appropriate.

## 2024-03-06 NOTE — ASSESSMENT & PLAN NOTE
Patient with moderate frequency PVCs and some episodes of triplets couplets and 4 beat runs infrequent in nature her echo shows a low normal EF with borderline enlargement patient stable symptomatically since starting on Toprol 25 a day did encourage mild to moderate aerobic activity and also recommended a cardiac MRI for further structural assessment of her heart she was going to hold off until her insurance changed and June before proceeding.

## 2024-03-07 ENCOUNTER — TELEPHONE (OUTPATIENT)
Dept: CARDIOLOGY | Facility: CLINIC | Age: 59
End: 2024-03-07
Payer: OTHER GOVERNMENT

## 2024-03-07 LAB — NT-PROBNP SERPL-MCNC: 164 PG/ML (ref 0–900)

## 2024-03-07 NOTE — TELEPHONE ENCOUNTER
----- Message from ABILIO Meza sent at 3/7/2024  7:29 AM EST -----  BNP level is in normal range, continue current medication

## 2024-03-15 ENCOUNTER — LAB (OUTPATIENT)
Dept: LAB | Facility: HOSPITAL | Age: 59
End: 2024-03-15
Payer: OTHER GOVERNMENT

## 2024-03-15 DIAGNOSIS — E03.9 HYPOTHYROIDISM, UNSPECIFIED TYPE: ICD-10-CM

## 2024-03-15 DIAGNOSIS — F51.05 INSOMNIA DUE TO MENTAL CONDITION: ICD-10-CM

## 2024-03-15 DIAGNOSIS — E78.5 HYPERLIPIDEMIA, UNSPECIFIED HYPERLIPIDEMIA TYPE: ICD-10-CM

## 2024-03-15 DIAGNOSIS — E11.65 TYPE 2 DIABETES MELLITUS WITH HYPERGLYCEMIA, WITHOUT LONG-TERM CURRENT USE OF INSULIN: ICD-10-CM

## 2024-03-15 LAB
ALBUMIN UR-MCNC: 2 MG/DL
AMPHET+METHAMPHET UR QL: NEGATIVE
BARBITURATES UR QL SCN: NEGATIVE
BENZODIAZ UR QL SCN: NEGATIVE
CANNABINOIDS SERPL QL: NEGATIVE
COCAINE UR QL: NEGATIVE
CREAT UR-MCNC: 125.8 MG/DL
FENTANYL UR-MCNC: NEGATIVE NG/ML
METHADONE UR QL SCN: NEGATIVE
MICROALBUMIN/CREAT UR: 15.9 MG/G (ref 0–29)
OPIATES UR QL: NEGATIVE
OXYCODONE UR QL SCN: NEGATIVE

## 2024-03-15 PROCEDURE — 80307 DRUG TEST PRSMV CHEM ANLYZR: CPT

## 2024-03-15 PROCEDURE — 82043 UR ALBUMIN QUANTITATIVE: CPT

## 2024-03-15 PROCEDURE — 82570 ASSAY OF URINE CREATININE: CPT

## 2024-03-19 DIAGNOSIS — M62.838 MUSCLE SPASM: ICD-10-CM

## 2024-03-19 RX ORDER — CYCLOBENZAPRINE HCL 10 MG
10 TABLET ORAL 2 TIMES DAILY PRN
Qty: 30 TABLET | Refills: 0 | Status: SHIPPED | OUTPATIENT
Start: 2024-03-19

## 2024-03-19 RX ORDER — ONDANSETRON 4 MG/1
4 TABLET, FILM COATED ORAL EVERY 8 HOURS PRN
Qty: 30 TABLET | Refills: 0 | Status: SHIPPED | OUTPATIENT
Start: 2024-03-19

## 2024-03-24 DIAGNOSIS — F41.1 GENERALIZED ANXIETY DISORDER: ICD-10-CM

## 2024-03-24 DIAGNOSIS — Z63.4 BEREAVEMENT: ICD-10-CM

## 2024-03-24 DIAGNOSIS — F51.05 INSOMNIA DUE TO MENTAL CONDITION: ICD-10-CM

## 2024-03-24 DIAGNOSIS — E11.9 TYPE 2 DIABETES MELLITUS WITHOUT COMPLICATION, WITHOUT LONG-TERM CURRENT USE OF INSULIN: ICD-10-CM

## 2024-03-24 DIAGNOSIS — F33.1 MAJOR DEPRESSIVE DISORDER, RECURRENT EPISODE, MODERATE: ICD-10-CM

## 2024-03-24 DIAGNOSIS — F43.10 POST TRAUMATIC STRESS DISORDER (PTSD): ICD-10-CM

## 2024-03-24 SDOH — SOCIAL STABILITY - SOCIAL INSECURITY: DISSAPEARANCE AND DEATH OF FAMILY MEMBER: Z63.4

## 2024-03-25 RX ORDER — METFORMIN HYDROCHLORIDE 500 MG/1
500 TABLET, EXTENDED RELEASE ORAL 2 TIMES DAILY
Qty: 180 TABLET | Refills: 1 | Status: SHIPPED | OUTPATIENT
Start: 2024-03-25

## 2024-03-25 RX ORDER — SERTRALINE HYDROCHLORIDE 100 MG/1
100 TABLET, FILM COATED ORAL DAILY
Qty: 90 TABLET | Refills: 1 | Status: SHIPPED | OUTPATIENT
Start: 2024-03-25

## 2024-03-25 RX ORDER — SERTRALINE HYDROCHLORIDE 25 MG/1
25 TABLET, FILM COATED ORAL DAILY
Qty: 90 TABLET | Refills: 1 | Status: SHIPPED | OUTPATIENT
Start: 2024-03-25

## 2024-03-25 NOTE — TELEPHONE ENCOUNTER
"NEXT VISIT WITH PROVIDER   Appointment with Izzy Narayan MD (03/29/2024)     LAST SEEN BY PROVIDER   Telemedicine with Izzy Narayan MD (12/29/2023)   \"CONTINUE zoloft 125 mg daily\"    LAST MED REFILL  sertraline (Zoloft) 25 MG tablet (10/31/2023)   Dispense Quantity: 90 tablet Refills: 1          Sig: Take 1 tablet by mouth Daily   sertraline (ZOLOFT) 100 MG tablet (10/31/2023)   Dispense Quantity: 90 tablet Refills: 1          Sig: Take 1 tablet by mouth Daily     PROVIDER PLEASE ADVISE   "

## 2024-03-27 DIAGNOSIS — R53.83 FATIGUE, UNSPECIFIED TYPE: Primary | ICD-10-CM

## 2024-03-27 DIAGNOSIS — E03.9 HYPOTHYROIDISM, UNSPECIFIED TYPE: ICD-10-CM

## 2024-03-27 DIAGNOSIS — E11.9 TYPE 2 DIABETES MELLITUS WITHOUT COMPLICATION, WITHOUT LONG-TERM CURRENT USE OF INSULIN: Primary | ICD-10-CM

## 2024-03-27 DIAGNOSIS — E78.5 HYPERLIPIDEMIA, UNSPECIFIED HYPERLIPIDEMIA TYPE: ICD-10-CM

## 2024-03-29 ENCOUNTER — TELEMEDICINE (OUTPATIENT)
Dept: PSYCHIATRY | Facility: CLINIC | Age: 59
End: 2024-03-29
Payer: OTHER GOVERNMENT

## 2024-03-29 DIAGNOSIS — F41.1 GENERALIZED ANXIETY DISORDER: ICD-10-CM

## 2024-03-29 DIAGNOSIS — F43.10 POST TRAUMATIC STRESS DISORDER (PTSD): ICD-10-CM

## 2024-03-29 DIAGNOSIS — Z63.4 BEREAVEMENT: ICD-10-CM

## 2024-03-29 DIAGNOSIS — F33.1 MAJOR DEPRESSIVE DISORDER, RECURRENT EPISODE, MODERATE: Primary | ICD-10-CM

## 2024-03-29 DIAGNOSIS — F51.05 INSOMNIA DUE TO MENTAL CONDITION: ICD-10-CM

## 2024-03-29 SDOH — SOCIAL STABILITY - SOCIAL INSECURITY: DISSAPEARANCE AND DEATH OF FAMILY MEMBER: Z63.4

## 2024-03-29 NOTE — PROGRESS NOTES
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 disorder (PTSD)  F43.10 309.81   4. 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 Bereavement  Z63.4 V62.82     3/29: Doing fairly well, job stressor saddened her but she's getting better. Continue meds. Saying No. Vacation in [place] to visit youngest son and grandkids, and camping.     Allowed patient to freely discuss and process issues, such as:  Setting boundaries, saying no, and how that has helped her MH.  Self care and how that has helped her MH.  Ongoing: Grieving her son.   ... using Rogerian psychotherapeutic techniques including unconditional positive regard, reflective listening, and demonstrating clear empathy.   Time (minutes) spent providing supportive psychotherapy: 16  (This time is exclusive to the therapy session and separate from the time spent on activities used to meet the criteria for the E/M service (history, exam, medical decision-making).)  Functional status: mild impairment  Treatment plan: Medication management and supportive psychotherapy  Prognosis: good  Progress: stable, grieving, close to goal  4w    12/29: Stable, well.     8/28: Grief plus possible underlying depression. Start light box for seasonal pattern. Cleaning things out of the house (\"purging\"). Close follow up.    6/6: Doing well. No changes now. 3 mos. Declines " therapy.    PLAN:  Safety: No acute safety concerns  Therapy: None  Risk Assessment: Risk of self-harm acutely is moderate.  Risk factors include anxiety disorder, mood disorder, PTSD, and recent psychosocial stressors (pandemic). Protective factors include no family history, denies access to guns/weapons, no present SI, no history of suicide attempts or self-harm in the past, minimal AODA, healthcare seeking, future orientation, willingness to engage in care.  Risk of self-harm chronically is also moderate, but could be further elevated in the event of treatment noncompliance and/or AODA.  Meds:  START BLT sept thru march.  CONTINUE zoloft 125 mg daily. Risks, benefits, alternatives discussed with patient including GI upset, nausea vomiting diarrhea, theoretical decrease of seizure threshold predisposing the patient to a slightly higher seizure risk, headaches, sexual dysfunction, serotonin syndrome, bleeding risk, increased suicidality in patients 24 years and younger, switching to umair/hypomania.  After discussion of these risks and benefits, the patient voiced understanding and agreed to proceed.  CONTINUE lunesta 3 mg qhs. Risks, benefits, alternatives discussed with patient including sedation, dizziness, falls risk, GI upset, amnesia, grogginess the following day.  Do not use before operating vehicle, vessel, or machine. After discussion of these risks and benefits, the patient voiced understanding and agreed to proceed.  Labs: UDS     Patient screened positive for depression based on a PHQ-9 score of 4 on 6/6/2023. Follow-up recommendations include: Prescribed antidepressant medication treatment and Suicide Risk Assessment performed.           TREATMENT PLAN/GOALS: Continue supportive psychotherapy efforts and medications as indicated. Treatment and medication options discussed during today's visit. Patient acknowledged and verbally consented to continue with current treatment plan and was educated on the  importance of compliance with treatment and follow-up appointments.    MEDICATION ISSUES:  KRIS reviewed as expected.  Discussed medication options and treatment plan of prescribed medication as well as the risks, benefits, and side effects including potential falls, possible impaired driving and metabolic adversities among others. Patient is agreeable to call the office with any worsening of symptoms or onset of side effects. Patient is agreeable to call 911 or go to the nearest ER should he/she begin having SI/HI. No medication side effects or related complaints today.     MEDS ORDERED DURING VISIT:  No orders of the defined types were placed in this encounter.      Return in about 4 weeks (around 4/26/2024) for Video visit.         This document has been electronically signed by Izzy Narayan MD  March 29, 2024 16:31 EDT    Dictated Utilizing Dragon Dictation: Part of this note may be an electronic transcription/translation of spoken language to printed text using the Dragon Dictation System.

## 2024-03-29 NOTE — PATIENT INSTRUCTIONS
1.  Please return to clinic at your next scheduled visit.  Contact the clinic ([phone]) at least 24 hours prior in the event you need to cancel.  2.  Do no harm to yourself or others.    3.  Avoid alcohol and drugs.    4.  Take all medications as prescribed.  Please contact the clinic with any concerns. If you are in need of medication refills, please call the clinic at [phone].    5. Should you want to get in touch with your provider, Dr. Izzy Narayan, please utilize [employer] or contact the office ([phone]), and staff will be able to page Dr. Narayan directly.  6.  In the event you have personal crisis, contact the following crisis numbers: Suicide Prevention Hotline 1-271.325.9553; [facility] Helpline 5-679-931-ANTONY; [facility] Emergency Room [phone]; text HELLO to 954483; or [phone].

## 2024-04-03 ENCOUNTER — LAB (OUTPATIENT)
Dept: LAB | Facility: HOSPITAL | Age: 59
End: 2024-04-03
Payer: OTHER GOVERNMENT

## 2024-04-03 DIAGNOSIS — R53.83 FATIGUE, UNSPECIFIED TYPE: ICD-10-CM

## 2024-04-03 DIAGNOSIS — E78.5 HYPERLIPIDEMIA, UNSPECIFIED HYPERLIPIDEMIA TYPE: ICD-10-CM

## 2024-04-03 DIAGNOSIS — E03.9 HYPOTHYROIDISM, UNSPECIFIED TYPE: ICD-10-CM

## 2024-04-03 DIAGNOSIS — E11.9 TYPE 2 DIABETES MELLITUS WITHOUT COMPLICATION, WITHOUT LONG-TERM CURRENT USE OF INSULIN: ICD-10-CM

## 2024-04-03 LAB
25(OH)D3 SERPL-MCNC: 25.4 NG/ML (ref 30–100)
ALBUMIN SERPL-MCNC: 4.4 G/DL (ref 3.5–5.2)
ALBUMIN/GLOB SERPL: 1.6 G/DL
ALP SERPL-CCNC: 114 U/L (ref 39–117)
ALT SERPL W P-5'-P-CCNC: 27 U/L (ref 1–33)
ANION GAP SERPL CALCULATED.3IONS-SCNC: 12 MMOL/L (ref 5–15)
AST SERPL-CCNC: 23 U/L (ref 1–32)
BILIRUB SERPL-MCNC: 0.4 MG/DL (ref 0–1.2)
BUN SERPL-MCNC: 15 MG/DL (ref 6–20)
BUN/CREAT SERPL: 16.3 (ref 7–25)
CALCIUM SPEC-SCNC: 9.3 MG/DL (ref 8.6–10.5)
CHLORIDE SERPL-SCNC: 106 MMOL/L (ref 98–107)
CHOLEST SERPL-MCNC: 247 MG/DL (ref 0–200)
CO2 SERPL-SCNC: 22 MMOL/L (ref 22–29)
CREAT SERPL-MCNC: 0.92 MG/DL (ref 0.57–1)
DEPRECATED RDW RBC AUTO: 43 FL (ref 37–54)
EGFRCR SERPLBLD CKD-EPI 2021: 72.8 ML/MIN/1.73
ERYTHROCYTE [DISTWIDTH] IN BLOOD BY AUTOMATED COUNT: 15.2 % (ref 12.3–15.4)
GLOBULIN UR ELPH-MCNC: 2.7 GM/DL
GLUCOSE SERPL-MCNC: 191 MG/DL (ref 65–99)
HBA1C MFR BLD: 8 % (ref 4.8–5.6)
HCT VFR BLD AUTO: 41.2 % (ref 34–46.6)
HDLC SERPL-MCNC: 43 MG/DL (ref 40–60)
HGB BLD-MCNC: 13.1 G/DL (ref 12–15.9)
IRON 24H UR-MRATE: 53 MCG/DL (ref 37–145)
IRON SATN MFR SERPL: 16 % (ref 20–50)
LDLC SERPL CALC-MCNC: 154 MG/DL (ref 0–100)
LDLC/HDLC SERPL: 3.5 {RATIO}
MCH RBC QN AUTO: 25.1 PG (ref 26.6–33)
MCHC RBC AUTO-ENTMCNC: 31.8 G/DL (ref 31.5–35.7)
MCV RBC AUTO: 79.1 FL (ref 79–97)
PLATELET # BLD AUTO: 261 10*3/MM3 (ref 140–450)
PMV BLD AUTO: 10.9 FL (ref 6–12)
POTASSIUM SERPL-SCNC: 4.1 MMOL/L (ref 3.5–5.2)
PROT SERPL-MCNC: 7.1 G/DL (ref 6–8.5)
RBC # BLD AUTO: 5.21 10*6/MM3 (ref 3.77–5.28)
SODIUM SERPL-SCNC: 140 MMOL/L (ref 136–145)
T4 FREE SERPL-MCNC: 1.15 NG/DL (ref 0.93–1.7)
TIBC SERPL-MCNC: 331 MCG/DL (ref 298–536)
TRANSFERRIN SERPL-MCNC: 222 MG/DL (ref 200–360)
TRIGL SERPL-MCNC: 268 MG/DL (ref 0–150)
TSH SERPL DL<=0.05 MIU/L-ACNC: 3.37 UIU/ML (ref 0.27–4.2)
VIT B12 BLD-MCNC: 269 PG/ML (ref 211–946)
VLDLC SERPL-MCNC: 50 MG/DL (ref 5–40)
WBC NRBC COR # BLD AUTO: 7.2 10*3/MM3 (ref 3.4–10.8)

## 2024-04-03 PROCEDURE — 80053 COMPREHEN METABOLIC PANEL: CPT

## 2024-04-03 PROCEDURE — 84466 ASSAY OF TRANSFERRIN: CPT

## 2024-04-03 PROCEDURE — 83036 HEMOGLOBIN GLYCOSYLATED A1C: CPT

## 2024-04-03 PROCEDURE — 84439 ASSAY OF FREE THYROXINE: CPT

## 2024-04-03 PROCEDURE — 84443 ASSAY THYROID STIM HORMONE: CPT

## 2024-04-03 PROCEDURE — 36415 COLL VENOUS BLD VENIPUNCTURE: CPT

## 2024-04-03 PROCEDURE — 82306 VITAMIN D 25 HYDROXY: CPT

## 2024-04-03 PROCEDURE — 83540 ASSAY OF IRON: CPT

## 2024-04-03 PROCEDURE — 85027 COMPLETE CBC AUTOMATED: CPT

## 2024-04-03 PROCEDURE — 82607 VITAMIN B-12: CPT

## 2024-04-03 PROCEDURE — 80061 LIPID PANEL: CPT

## 2024-04-08 DIAGNOSIS — E11.65 TYPE 2 DIABETES MELLITUS WITH HYPERGLYCEMIA, WITHOUT LONG-TERM CURRENT USE OF INSULIN: Primary | ICD-10-CM

## 2024-04-15 ENCOUNTER — PATIENT MESSAGE (OUTPATIENT)
Dept: CARDIOLOGY | Facility: CLINIC | Age: 59
End: 2024-04-15
Payer: OTHER GOVERNMENT

## 2024-04-15 DIAGNOSIS — E11.65 TYPE 2 DIABETES MELLITUS WITH HYPERGLYCEMIA, WITHOUT LONG-TERM CURRENT USE OF INSULIN: ICD-10-CM

## 2024-04-15 DIAGNOSIS — I49.8 VENTRICULAR TRIGEMINY: ICD-10-CM

## 2024-04-15 RX ORDER — METOPROLOL SUCCINATE 25 MG/1
25 TABLET, EXTENDED RELEASE ORAL DAILY
Qty: 90 TABLET | Refills: 3 | OUTPATIENT
Start: 2024-04-15

## 2024-04-15 NOTE — TELEPHONE ENCOUNTER
BETINA PT.  ADVISED PT MEDICATION WAS SENT TO [facility] ON 01/15/24 FOR ONE YEAR SUPPLY.  PT IS GOING TO CONTACT PHARMACY.

## 2024-04-23 DIAGNOSIS — I73.9 CLAUDICATION: ICD-10-CM

## 2024-04-23 RX ORDER — CILOSTAZOL 100 MG/1
100 TABLET ORAL 2 TIMES DAILY
Qty: 180 TABLET | Refills: 1 | Status: SHIPPED | OUTPATIENT
Start: 2024-04-23

## 2024-04-24 DIAGNOSIS — F43.10 POST TRAUMATIC STRESS DISORDER (PTSD): ICD-10-CM

## 2024-04-24 DIAGNOSIS — F33.1 MAJOR DEPRESSIVE DISORDER, RECURRENT EPISODE, MODERATE: ICD-10-CM

## 2024-04-24 DIAGNOSIS — Z63.4 BEREAVEMENT: ICD-10-CM

## 2024-04-24 DIAGNOSIS — F41.1 GENERALIZED ANXIETY DISORDER: ICD-10-CM

## 2024-04-24 DIAGNOSIS — F51.05 INSOMNIA DUE TO MENTAL CONDITION: ICD-10-CM

## 2024-04-24 RX ORDER — SERTRALINE HYDROCHLORIDE 25 MG/1
25 TABLET, FILM COATED ORAL DAILY
Qty: 90 TABLET | Refills: 1 | OUTPATIENT
Start: 2024-04-24

## 2024-04-24 RX ORDER — SERTRALINE HYDROCHLORIDE 100 MG/1
100 TABLET, FILM COATED ORAL DAILY
Qty: 90 TABLET | Refills: 1 | OUTPATIENT
Start: 2024-04-24

## 2024-04-24 SDOH — SOCIAL STABILITY - SOCIAL INSECURITY: DISSAPEARANCE AND DEATH OF FAMILY MEMBER: Z63.4

## 2024-04-24 NOTE — TELEPHONE ENCOUNTER
NEXT VISIT WITH PROVIDER   Appointment with Izzy Narayan MD (04/29/2024)     LAST SEEN BY PROVIDER   Telemedicine with Izzy Narayan MD (03/29/2024)     LAST MED REFILL  sertraline (Zoloft) 25 MG tablet (03/25/2024)   sertraline (ZOLOFT) 100 MG tablet (03/25/2024)     TOO SOON FOR REFILLS     PROVIDER PLEASE ADVISE

## 2024-04-29 ENCOUNTER — TELEMEDICINE (OUTPATIENT)
Dept: PSYCHIATRY | Facility: CLINIC | Age: 59
End: 2024-04-29
Payer: OTHER GOVERNMENT

## 2024-04-29 DIAGNOSIS — F33.1 MAJOR DEPRESSIVE DISORDER, RECURRENT EPISODE, MODERATE: ICD-10-CM

## 2024-04-29 DIAGNOSIS — F43.10 POST TRAUMATIC STRESS DISORDER (PTSD): ICD-10-CM

## 2024-04-29 DIAGNOSIS — F51.05 INSOMNIA DUE TO MENTAL CONDITION: ICD-10-CM

## 2024-04-29 DIAGNOSIS — F41.1 GENERALIZED ANXIETY DISORDER: ICD-10-CM

## 2024-04-29 DIAGNOSIS — F33.40 RECURRENT MAJOR DEPRESSIVE DISORDER IN REMISSION: ICD-10-CM

## 2024-04-29 DIAGNOSIS — Z63.4 BEREAVEMENT: Primary | ICD-10-CM

## 2024-04-29 PROCEDURE — 90833 PSYTX W PT W E/M 30 MIN: CPT | Performed by: STUDENT IN AN ORGANIZED HEALTH CARE EDUCATION/TRAINING PROGRAM

## 2024-04-29 PROCEDURE — 99214 OFFICE O/P EST MOD 30 MIN: CPT | Performed by: STUDENT IN AN ORGANIZED HEALTH CARE EDUCATION/TRAINING PROGRAM

## 2024-04-29 SDOH — SOCIAL STABILITY - SOCIAL INSECURITY: DISSAPEARANCE AND DEATH OF FAMILY MEMBER: Z63.4

## 2024-04-29 NOTE — PATIENT INSTRUCTIONS
1.  Please return to clinic at your next scheduled visit.  Contact the clinic ([phone]) at least 24 hours prior in the event you need to cancel.  2.  Do no harm to yourself or others.    3.  Avoid alcohol and drugs.    4.  Take all medications as prescribed.  Please contact the clinic with any concerns. If you are in need of medication refills, please call the clinic at [phone].    5. Should you want to get in touch with your provider, Dr. Izzy Narayan, please utilize [employer] or contact the office ([phone]), and staff will be able to page Dr. Narayan directly.  6.  In the event you have personal crisis, contact the following crisis numbers: Suicide Prevention Hotline 1-563.889.3065; [facility] Helpline 7-610-969-ANTONY; [facility] Emergency Room [phone]; text HELLO to 514624; or [phone].

## 2024-04-29 NOTE — PROGRESS NOTES
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 1.00 mg/dL Final    Sodium 12/29/2023 140  136 - 145 mmol/L Final    Potassium 12/29/2023 3.7  3.5 - 5.2 mmol/L Final    Chloride 12/29/2023 106  98 - 107 mmol/L Final    CO2 12/29/2023 20.8 (L)  22.0 - 29.0 mmol/L Final    Calcium 12/29/2023 9.0  8.6 - 10.5 mg/dL Final    Total Protein 12/29/2023 6.7  6.0 - 8.5 g/dL Final    Albumin 12/29/2023 4.2  3.5 - 5.2 g/dL Final    ALT (SGPT) 12/29/2023 24  1 - 33 U/L Final    AST (SGOT) 12/29/2023 20  1 - 32 U/L Final    Alkaline Phosphatase 12/29/2023 104  39 - 117 U/L Final    Total Bilirubin 12/29/2023 0.3  0.0 - 1.2 mg/dL Final    Globulin 12/29/2023 2.5  gm/dL Final    A/G Ratio 12/29/2023 1.7  g/dL Final    BUN/Creatinine Ratio 12/29/2023 11.8  7.0 - 25.0 Final    Anion Gap 12/29/2023 13.2  5.0 - 15.0 mmol/L Final    eGFR 12/29/2023 92.1  >60.0 mL/min/1.73 Final    Total Cholesterol 12/29/2023 226 (H)  0 - 200 mg/dL Final    Triglycerides 12/29/2023 260 (H)  0 - 150 mg/dL Final    HDL Cholesterol 12/29/2023 44  40 - 60 mg/dL Final    LDL Cholesterol  12/29/2023 135 (H)  0 - 100 mg/dL Final    VLDL Cholesterol 12/29/2023 47 (H)  5 - 40 mg/dL Final    LDL/HDL Ratio 12/29/2023 2.95   Final    TSH 12/29/2023 4.600 (H)  0.270 - 4.200 uIU/mL Final    Free T4 12/29/2023 0.99  0.93 - 1.70 ng/dL Final    T4 results may be  falsely increased if patient taking Biotin.       EKG Results:  No orders to display       Imaging Results:  No Images in the past 120 days found..      Assessment & Plan   Diagnoses and all orders for this visit:    1. Bereavement (Primary)    2. Generalized anxiety disorder    3. Post traumatic stress disorder (PTSD)    4. Insomnia due to mental condition    5. Recurrent major depressive disorder in remission        Visit Diagnoses:    ICD-10-CM ICD-9-CM   1. Bereavement  Z63.4 V62.82   2. Generalized anxiety disorder  F41.1 300.02   3. Post traumatic stress disorder (PTSD)  F43.10 309.81   4. 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 Recurrent major depressive disorder in remission  F33.40 296.35     4/29/24: Depressed, anxious increase zoloft.    Allowed patient to freely discuss and process issues, such as:  Grieving her son.  Anxiety and depression related to her work situation  ... using Rogerian psychotherapeutic techniques including unconditional positive regard, reflective listening, and demonstrating clear empathy, with the goal of ameliorating symptoms and maintaining, restoring, or improving self-esteem, adaptive skills, and ego or psychological functions (Maryann et al. 1991).  Time (minutes) spent providing supportive psychotherapy: 16  (This time is exclusive to the therapy session and separate from the time spent on activities used to meet the criteria for the E/M service (history, exam, medical decision-making).)  Start: 6:00  Stop: 6:16  Functional status: mild impairment  Treatment plan: Medication management and supportive psychotherapy  Prognosis: good  Progress: depressed  4w    3/29: Doing fairly well, job stressor saddened her but she's getting better. Continue meds. Saying No. Vacation in [place] to visit youngest son and grandkids, and camping.     12/29: Stable, well.     8/28: Grief plus possible underlying depression. Start light box for seasonal pattern. Cleaning  "things out of the house (\"purging\"). Close follow up.    6/6: Doing well. No changes now. 3 mos. Declines therapy.    PLAN:  Safety: No acute safety concerns  Therapy: None  Risk Assessment: Risk of self-harm acutely is moderate.  Risk factors include anxiety disorder, mood disorder, PTSD, and recent psychosocial stressors (pandemic). Protective factors include no family history, denies access to guns/weapons, no present SI, no history of suicide attempts or self-harm in the past, minimal AODA, healthcare seeking, future orientation, willingness to engage in care.  Risk of self-harm chronically is also moderate, but could be further elevated in the event of treatment noncompliance and/or AODA.  Meds:  START BLT sept thru march.  INCREASE zoloft 125 to 150 mg daily. Risks, benefits, alternatives discussed with patient including GI upset, nausea vomiting diarrhea, theoretical decrease of seizure threshold predisposing the patient to a slightly higher seizure risk, headaches, sexual dysfunction, serotonin syndrome, bleeding risk, increased suicidality in patients 24 years and younger, switching to umair/hypomania.  After discussion of these risks and benefits, the patient voiced understanding and agreed to proceed.  CONTINUE lunesta 3 mg qhs. Risks, benefits, alternatives discussed with patient including sedation, dizziness, falls risk, GI upset, amnesia, grogginess the following day.  Do not use before operating vehicle, vessel, or machine. After discussion of these risks and benefits, the patient voiced understanding and agreed to proceed.  Labs: UDS neg 3/24    Patient screened positive for depression based on a PHQ-9 score of 4 on 6/6/2023. Follow-up recommendations include: Prescribed antidepressant medication treatment and Suicide Risk Assessment performed.           TREATMENT PLAN/GOALS: Continue supportive psychotherapy efforts and medications as indicated. Treatment and medication options discussed during " today's visit. Patient acknowledged and verbally consented to continue with current treatment plan and was educated on the importance of compliance with treatment and follow-up appointments.    MEDICATION ISSUES:  KRIS reviewed as expected.  Discussed medication options and treatment plan of prescribed medication as well as the risks, benefits, and side effects including potential falls, possible impaired driving and metabolic adversities among others. Patient is agreeable to call the office with any worsening of symptoms or onset of side effects. Patient is agreeable to call 911 or go to the nearest ER should he/she begin having SI/HI. No medication side effects or related complaints today.     MEDS ORDERED DURING VISIT:  No orders of the defined types were placed in this encounter.      Return in about 3 months (around 7/29/2024) for Video visit.         This document has been electronically signed by Izzy Narayan MD  April 29, 2024 18:03 EDT    Dictated Utilizing Dragon Dictation: Part of this note may be an electronic transcription/translation of spoken language to printed text using the Dragon Dictation System.

## 2024-04-30 ENCOUNTER — TELEPHONE (OUTPATIENT)
Dept: PSYCHIATRY | Facility: CLINIC | Age: 59
End: 2024-04-30
Payer: OTHER GOVERNMENT

## 2024-05-07 RX ORDER — DULAGLUTIDE 1.5 MG/.5ML
1.5 INJECTION, SOLUTION SUBCUTANEOUS WEEKLY
Qty: 6 ML | Refills: 1 | Status: SHIPPED | OUTPATIENT
Start: 2024-05-07 | End: 2024-05-10 | Stop reason: ALTCHOICE

## 2024-05-10 RX ORDER — DULAGLUTIDE 0.75 MG/.5ML
0.75 INJECTION, SOLUTION SUBCUTANEOUS WEEKLY
Qty: 6 ML | Refills: 1 | Status: SHIPPED | OUTPATIENT
Start: 2024-05-10

## 2024-06-03 DIAGNOSIS — E11.65 TYPE 2 DIABETES MELLITUS WITH HYPERGLYCEMIA, WITHOUT LONG-TERM CURRENT USE OF INSULIN: Primary | ICD-10-CM

## 2024-06-05 ENCOUNTER — TELEMEDICINE (OUTPATIENT)
Dept: PSYCHIATRY | Facility: CLINIC | Age: 59
End: 2024-06-05
Payer: OTHER GOVERNMENT

## 2024-06-05 DIAGNOSIS — F33.40 RECURRENT MAJOR DEPRESSIVE DISORDER IN REMISSION: Primary | ICD-10-CM

## 2024-06-05 DIAGNOSIS — F41.1 GENERALIZED ANXIETY DISORDER: ICD-10-CM

## 2024-06-05 DIAGNOSIS — Z63.4 BEREAVEMENT: ICD-10-CM

## 2024-06-05 DIAGNOSIS — F43.10 POST TRAUMATIC STRESS DISORDER (PTSD): ICD-10-CM

## 2024-06-05 DIAGNOSIS — F51.05 INSOMNIA DUE TO MENTAL CONDITION: ICD-10-CM

## 2024-06-05 PROCEDURE — 99214 OFFICE O/P EST MOD 30 MIN: CPT | Performed by: STUDENT IN AN ORGANIZED HEALTH CARE EDUCATION/TRAINING PROGRAM

## 2024-06-05 PROCEDURE — 90833 PSYTX W PT W E/M 30 MIN: CPT | Performed by: STUDENT IN AN ORGANIZED HEALTH CARE EDUCATION/TRAINING PROGRAM

## 2024-06-05 RX ORDER — ESZOPICLONE 3 MG/1
3 TABLET, FILM COATED ORAL NIGHTLY
Qty: 30 TABLET | Refills: 5 | Status: SHIPPED | OUTPATIENT
Start: 2024-06-26

## 2024-06-05 SDOH — SOCIAL STABILITY - SOCIAL INSECURITY: DISSAPEARANCE AND DEATH OF FAMILY MEMBER: Z63.4

## 2024-06-05 NOTE — PATIENT INSTRUCTIONS
1.  Please return to clinic at your next scheduled visit.  Contact the clinic ([phone]) at least 24 hours prior in the event you need to cancel.  2.  Do no harm to yourself or others.    3.  Avoid alcohol and drugs.    4.  Take all medications as prescribed.  Please contact the clinic with any concerns. If you are in need of medication refills, please call the clinic at [phone].    5. Should you want to get in touch with your provider, Dr. Izzy Narayan, please utilize [employer] or contact the office ([phone]), and staff will be able to page Dr. Narayan directly.  6.  In the event you have personal crisis, contact the following crisis numbers: Suicide Prevention Hotline 1-146.206.6456; [facility] Helpline 3-946-883-ANTONY; [facility] [phone]; text HELLO to 216047; or 667.

## 2024-06-05 NOTE — PROGRESS NOTES
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 12/29/2023 140  136 - 145 mmol/L Final    Potassium 12/29/2023 3.7  3.5 - 5.2 mmol/L Final    Chloride 12/29/2023 106  98 - 107 mmol/L Final    CO2 12/29/2023 20.8 (L)  22.0 - 29.0 mmol/L Final    Calcium 12/29/2023 9.0  8.6 - 10.5 mg/dL Final    Total Protein 12/29/2023 6.7  6.0 - 8.5 g/dL Final    Albumin 12/29/2023 4.2  3.5 - 5.2 g/dL Final    ALT (SGPT) 12/29/2023 24  1 - 33 U/L Final    AST (SGOT) 12/29/2023 20  1 - 32 U/L Final    Alkaline Phosphatase 12/29/2023 104  39 - 117 U/L Final    Total Bilirubin 12/29/2023 0.3  0.0 - 1.2 mg/dL Final    Globulin 12/29/2023 2.5  gm/dL Final    A/G Ratio 12/29/2023 1.7  g/dL Final    BUN/Creatinine Ratio 12/29/2023 11.8  7.0 - 25.0 Final    Anion Gap 12/29/2023 13.2  5.0 - 15.0 mmol/L Final    eGFR 12/29/2023 92.1  >60.0 mL/min/1.73 Final    Total Cholesterol 12/29/2023 226 (H)  0 - 200 mg/dL Final    Triglycerides 12/29/2023 260 (H)  0 - 150 mg/dL Final    HDL Cholesterol 12/29/2023 44  40 - 60 mg/dL Final    LDL Cholesterol  12/29/2023 135 (H)  0 - 100 mg/dL Final    VLDL Cholesterol 12/29/2023 47 (H)  5 - 40 mg/dL Final    LDL/HDL Ratio 12/29/2023 2.95   Final    TSH 12/29/2023 4.600 (H)  0.270 - 4.200 uIU/mL Final    Free T4 12/29/2023 0.99  0.93 - 1.70 ng/dL Final    T4 results may be falsely increased if patient taking Biotin.       EKG Results:  No orders to  display       Imaging Results:  No Images in the past 120 days found..      Assessment & Plan   Diagnoses and all orders for this visit:    1. Bereavement (Primary)    2. Generalized anxiety disorder    3. Post traumatic stress disorder (PTSD)    4. Insomnia due to mental condition  -     eszopiclone (LUNESTA) 3 MG tablet; Take 1 tablet by mouth Every Night.  Dispense: 30 tablet; Refill: 5    5. Recurrent major depressive disorder in remission        Visit Diagnoses:    ICD-10-CM ICD-9-CM   1. Bereavement  Z63.4 V62.82   2. Generalized anxiety disorder  F41.1 300.02   3. Post traumatic stress disorder (PTSD)  F43.10 309.81   4. 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 Recurrent major depressive disorder in remission  F33.40 296.35         06/05/2024: Improved, now stable. No changes. Praised for setting boundaries at work, which has paid off.    Allowed patient to freely discuss and process issues, such as:  Anxiety and depression related to work, setting boundaries  Grieving her son  ... using Rogerian psychotherapeutic techniques including unconditional positive regard, reflective listening, and demonstrating clear empathy, with the goal of ameliorating symptoms and maintaining, restoring, or improving self-esteem, adaptive skills, and ego or psychological functions (Maryann et al. 1991).  Time (minutes) spent providing supportive psychotherapy: 16  (This time is exclusive to the therapy session and separate from the time spent on activities used to meet the criteria for the E/M service (history, exam, medical decision-making).)  Start: 5:21  Stop: 5:37  Functional status: mild impairment  Treatment plan: Medication management and supportive psychotherapy  Prognosis: good  Progress: improved, stable  6w    4/29/24: Depressed, anxious increase zoloft.    3/29: Doing fairly well, job stressor saddened her but she's getting better. Continue meds. Saying No. Vacation in [place] to visit youngest  "son and grandkids, and camping.     12/29: Stable, well.     8/28: Grief plus possible underlying depression. Start light box for seasonal pattern. Cleaning things out of the house (\"purging\"). Close follow up.    6/6: Doing well. No changes now. 3 mos. Declines therapy.    PLAN:  Safety: No acute safety concerns  Therapy: None  Risk Assessment: Risk of self-harm acutely is moderate.  Risk factors include anxiety disorder, mood disorder, PTSD, and recent psychosocial stressors (pandemic). Protective factors include no family history, denies access to guns/weapons, no present SI, no history of suicide attempts or self-harm in the past, minimal AODA, healthcare seeking, future orientation, willingness to engage in care.  Risk of self-harm chronically is also moderate, but could be further elevated in the event of treatment noncompliance and/or AODA.  Meds:  START BLT sept thru march.  CONTINUE zoloft 150 mg daily. Risks, benefits, alternatives discussed with patient including GI upset, nausea vomiting diarrhea, theoretical decrease of seizure threshold predisposing the patient to a slightly higher seizure risk, headaches, sexual dysfunction, serotonin syndrome, bleeding risk, increased suicidality in patients 24 years and younger, switching to umair/hypomania.  After discussion of these risks and benefits, the patient voiced understanding and agreed to proceed.  CONTINUE lunesta 3 mg qhs. Risks, benefits, alternatives discussed with patient including sedation, dizziness, falls risk, GI upset, amnesia, grogginess the following day.  Do not use before operating vehicle, vessel, or machine. After discussion of these risks and benefits, the patient voiced understanding and agreed to proceed.  Labs: UDS neg 3/24    Patient screened positive for depression based on a PHQ-9 score of 3 on 6/5/2024. Follow-up recommendations include: Prescribed antidepressant medication treatment and Suicide Risk Assessment performed.   "         TREATMENT PLAN/GOALS: Continue supportive psychotherapy efforts and medications as indicated. Treatment and medication options discussed during today's visit. Patient acknowledged and verbally consented to continue with current treatment plan and was educated on the importance of compliance with treatment and follow-up appointments.    MEDICATION ISSUES:  KRIS reviewed as expected.  Discussed medication options and treatment plan of prescribed medication as well as the risks, benefits, and side effects including potential falls, possible impaired driving and metabolic adversities among others. Patient is agreeable to call the office with any worsening of symptoms or onset of side effects. Patient is agreeable to call 911 or go to the nearest ER should he/she begin having SI/HI. No medication side effects or related complaints today.     MEDS ORDERED DURING VISIT:  New Medications Ordered This Visit   Medications    eszopiclone (LUNESTA) 3 MG tablet     Sig: Take 1 tablet by mouth Every Night.     Dispense:  30 tablet     Refill:  5       Return in about 2 months (around 8/5/2024) for Video visit.         This document has been electronically signed by Izzy Narayan MD  June 5, 2024 17:26 EDT    Dictated Utilizing Dragon Dictation: Part of this note may be an electronic transcription/translation of spoken language to printed text using the Dragon Dictation System.

## 2024-06-10 ENCOUNTER — TELEPHONE (OUTPATIENT)
Dept: PSYCHIATRY | Facility: CLINIC | Age: 59
End: 2024-06-10
Payer: OTHER GOVERNMENT

## 2024-06-19 RX ORDER — EZETIMIBE 10 MG/1
10 TABLET ORAL DAILY
Qty: 90 TABLET | Refills: 1 | Status: SHIPPED | OUTPATIENT
Start: 2024-06-19

## 2024-06-24 DIAGNOSIS — E78.5 HYPERLIPIDEMIA, UNSPECIFIED HYPERLIPIDEMIA TYPE: ICD-10-CM

## 2024-06-24 DIAGNOSIS — E03.9 HYPOTHYROIDISM, UNSPECIFIED TYPE: ICD-10-CM

## 2024-06-24 DIAGNOSIS — E11.65 TYPE 2 DIABETES MELLITUS WITH HYPERGLYCEMIA, WITHOUT LONG-TERM CURRENT USE OF INSULIN: Primary | ICD-10-CM

## 2024-06-24 DIAGNOSIS — I10 PRIMARY HYPERTENSION: ICD-10-CM

## 2024-07-22 ENCOUNTER — LAB (OUTPATIENT)
Dept: LAB | Facility: HOSPITAL | Age: 59
End: 2024-07-22
Payer: OTHER GOVERNMENT

## 2024-07-22 DIAGNOSIS — I10 PRIMARY HYPERTENSION: ICD-10-CM

## 2024-07-22 DIAGNOSIS — E11.65 TYPE 2 DIABETES MELLITUS WITH HYPERGLYCEMIA, WITHOUT LONG-TERM CURRENT USE OF INSULIN: ICD-10-CM

## 2024-07-22 DIAGNOSIS — E03.9 HYPOTHYROIDISM, UNSPECIFIED TYPE: ICD-10-CM

## 2024-07-22 DIAGNOSIS — E78.5 HYPERLIPIDEMIA, UNSPECIFIED HYPERLIPIDEMIA TYPE: ICD-10-CM

## 2024-07-22 LAB
ALBUMIN SERPL-MCNC: 4.3 G/DL (ref 3.5–5.2)
ALBUMIN/GLOB SERPL: 2 G/DL
ALP SERPL-CCNC: 93 U/L (ref 39–117)
ALT SERPL W P-5'-P-CCNC: 13 U/L (ref 1–33)
ANION GAP SERPL CALCULATED.3IONS-SCNC: 12.1 MMOL/L (ref 5–15)
AST SERPL-CCNC: 14 U/L (ref 1–32)
BILIRUB SERPL-MCNC: 0.3 MG/DL (ref 0–1.2)
BUN SERPL-MCNC: 9 MG/DL (ref 6–20)
BUN/CREAT SERPL: 12 (ref 7–25)
CALCIUM SPEC-SCNC: 9.3 MG/DL (ref 8.6–10.5)
CHLORIDE SERPL-SCNC: 105 MMOL/L (ref 98–107)
CHOLEST SERPL-MCNC: 201 MG/DL (ref 0–200)
CO2 SERPL-SCNC: 23.9 MMOL/L (ref 22–29)
CREAT SERPL-MCNC: 0.75 MG/DL (ref 0.57–1)
DEPRECATED RDW RBC AUTO: 43.1 FL (ref 37–54)
EGFRCR SERPLBLD CKD-EPI 2021: 93 ML/MIN/1.73
ERYTHROCYTE [DISTWIDTH] IN BLOOD BY AUTOMATED COUNT: 15 % (ref 12.3–15.4)
GLOBULIN UR ELPH-MCNC: 2.2 GM/DL
GLUCOSE SERPL-MCNC: 139 MG/DL (ref 65–99)
HBA1C MFR BLD: 6.5 % (ref 4.8–5.6)
HCT VFR BLD AUTO: 38.9 % (ref 34–46.6)
HDLC SERPL-MCNC: 41 MG/DL (ref 40–60)
HGB BLD-MCNC: 12.5 G/DL (ref 12–15.9)
LDLC SERPL CALC-MCNC: 126 MG/DL (ref 0–100)
LDLC/HDLC SERPL: 2.98 {RATIO}
MCH RBC QN AUTO: 25.6 PG (ref 26.6–33)
MCHC RBC AUTO-ENTMCNC: 32.1 G/DL (ref 31.5–35.7)
MCV RBC AUTO: 79.7 FL (ref 79–97)
PLATELET # BLD AUTO: 251 10*3/MM3 (ref 140–450)
PMV BLD AUTO: 10.5 FL (ref 6–12)
POTASSIUM SERPL-SCNC: 3.3 MMOL/L (ref 3.5–5.2)
PROT SERPL-MCNC: 6.5 G/DL (ref 6–8.5)
RBC # BLD AUTO: 4.88 10*6/MM3 (ref 3.77–5.28)
SODIUM SERPL-SCNC: 141 MMOL/L (ref 136–145)
T4 FREE SERPL-MCNC: 1.21 NG/DL (ref 0.92–1.68)
TRIGL SERPL-MCNC: 189 MG/DL (ref 0–150)
TSH SERPL DL<=0.05 MIU/L-ACNC: 3.15 UIU/ML (ref 0.27–4.2)
VLDLC SERPL-MCNC: 34 MG/DL (ref 5–40)
WBC NRBC COR # BLD AUTO: 6.99 10*3/MM3 (ref 3.4–10.8)

## 2024-07-22 PROCEDURE — 84443 ASSAY THYROID STIM HORMONE: CPT

## 2024-07-22 PROCEDURE — 80053 COMPREHEN METABOLIC PANEL: CPT

## 2024-07-22 PROCEDURE — 83036 HEMOGLOBIN GLYCOSYLATED A1C: CPT

## 2024-07-22 PROCEDURE — 84439 ASSAY OF FREE THYROXINE: CPT

## 2024-07-22 PROCEDURE — 85027 COMPLETE CBC AUTOMATED: CPT

## 2024-07-22 PROCEDURE — 80061 LIPID PANEL: CPT

## 2024-07-22 PROCEDURE — 36415 COLL VENOUS BLD VENIPUNCTURE: CPT

## 2024-08-12 DIAGNOSIS — E03.9 HYPOTHYROIDISM, UNSPECIFIED TYPE: ICD-10-CM

## 2024-08-12 DIAGNOSIS — M62.838 MUSCLE SPASM: ICD-10-CM

## 2024-08-13 RX ORDER — CYCLOBENZAPRINE HCL 10 MG
10 TABLET ORAL 2 TIMES DAILY PRN
Qty: 30 TABLET | Refills: 0 | Status: SHIPPED | OUTPATIENT
Start: 2024-08-13

## 2024-08-13 RX ORDER — LEVOTHYROXINE SODIUM 50 MCG
50 TABLET ORAL DAILY
Qty: 90 TABLET | Refills: 1 | Status: SHIPPED | OUTPATIENT
Start: 2024-08-13

## 2024-09-06 RX ORDER — DULAGLUTIDE 3 MG/.5ML
3 INJECTION, SOLUTION SUBCUTANEOUS WEEKLY
Qty: 6 ML | Refills: 1 | Status: SHIPPED | OUTPATIENT
Start: 2024-09-06

## 2024-09-20 RX ORDER — TRETINOIN 0.5 MG/G
1 CREAM TOPICAL NIGHTLY
Qty: 40 G | Refills: 0 | Status: SHIPPED | OUTPATIENT
Start: 2024-09-20

## 2024-09-24 DIAGNOSIS — F41.1 GENERALIZED ANXIETY DISORDER: ICD-10-CM

## 2024-09-24 DIAGNOSIS — Z63.4 BEREAVEMENT: ICD-10-CM

## 2024-09-24 DIAGNOSIS — F43.10 POST TRAUMATIC STRESS DISORDER (PTSD): ICD-10-CM

## 2024-09-24 DIAGNOSIS — F33.1 MAJOR DEPRESSIVE DISORDER, RECURRENT EPISODE, MODERATE: ICD-10-CM

## 2024-09-24 DIAGNOSIS — F51.05 INSOMNIA DUE TO MENTAL CONDITION: ICD-10-CM

## 2024-09-24 SDOH — SOCIAL STABILITY - SOCIAL INSECURITY: DISSAPEARANCE AND DEATH OF FAMILY MEMBER: Z63.4

## 2024-09-25 RX ORDER — SERTRALINE HYDROCHLORIDE 100 MG/1
100 TABLET, FILM COATED ORAL DAILY
Qty: 90 TABLET | Refills: 0 | Status: SHIPPED | OUTPATIENT
Start: 2024-09-25 | End: 2024-09-30 | Stop reason: SDUPTHER

## 2024-09-27 NOTE — PROGRESS NOTES
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 amnesia, grogginess the following day.  Do not use before operating vehicle, vessel, or machine. After discussion of these risks and benefits, the patient voiced understanding and agreed to proceed.  Labs: UDS neg 3/24    Patient screened positive for depression based on a PHQ-9 score of 3 on 6/5/2024. Follow-up recommendations include: Prescribed antidepressant medication treatment and Suicide Risk Assessment performed.           TREATMENT PLAN/GOALS: Continue supportive psychotherapy efforts and medications as indicated. Treatment and medication options discussed during today's visit. Patient acknowledged and verbally consented to continue with current treatment plan and was educated on the importance of compliance with treatment and follow-up appointments.    MEDICATION ISSUES:  KRIS reviewed as expected.  Discussed medication options and treatment plan of prescribed medication as well as the risks, benefits, and side effects including potential falls, possible impaired driving and metabolic adversities among others. Patient is agreeable to call the office with any worsening of symptoms or onset of side effects. Patient is agreeable to call 911 or go to the nearest ER should he/she begin having SI/HI. No medication side effects or related complaints  today.     MEDS ORDERED DURING VISIT:  New Medications Ordered This Visit   Medications    sertraline (ZOLOFT) 100 MG tablet     Sig: Take 1 tablet by mouth Daily.     Dispense:  90 tablet     Refill:  3     Total dose 150 mg daily. Thank you for the help. Please call with questions: [phone].    sertraline (Zoloft) 50 MG tablet     Sig: Take 1 tablet by mouth Daily.     Dispense:  90 tablet     Refill:  3     Total dose 150 mg daily. Thank you for the help. Please call with questions: [phone].       Return in about 3 months (around 12/30/2024) for Video visit.         This document has been electronically signed by Izzy Narayan MD  September 30, 2024 07:58 EDT    Dictated Utilizing Dragon Dictation: Part of this note may be an electronic transcription/translation of spoken language to printed text using the Dragon Dictation System.

## 2024-09-27 NOTE — PATIENT INSTRUCTIONS
1.  Please return to clinic at your next scheduled visit.  Contact the clinic ([phone]) at least 24 hours prior in the event you need to cancel.  2.  Do no harm to yourself or others.    3.  Avoid alcohol and drugs.    4.  Take all medications as prescribed.  Please contact the clinic with any concerns. If you are in need of medication refills, please call the clinic at [phone].    5. Should you want to get in touch with your provider, Dr. Izzy Narayan, please utilize [employer] or contact the office ([phone]), and staff will be able to page Dr. Narayan directly.  6.  In the event you have personal crisis, contact the following crisis numbers: Suicide Prevention Hotline 1-266.615.7669; [facility] Helpline 6-653-825-STTT; [facility] Emergency Room [phone]; text HELLO to 228143; or 861.     Light box/Happy Light/Light Therapy: Obtain a light box, 10,000 lux, put the box about 1 foot away from you, facing you at an angle (not directly), use it daily, right after waking up, for 1 hour daily. Don't stare directly at it. Read, eat, watch tv, etc. Use from September through March. Call your insurance company to see if they can reimburse you for the cost. Available at big box retailers. I used [employer] to get a Blink Messenger one.

## 2024-09-30 ENCOUNTER — TELEPHONE (OUTPATIENT)
Dept: PSYCHIATRY | Facility: CLINIC | Age: 59
End: 2024-09-30

## 2024-09-30 ENCOUNTER — TELEMEDICINE (OUTPATIENT)
Dept: PSYCHIATRY | Facility: CLINIC | Age: 59
End: 2024-09-30
Payer: OTHER GOVERNMENT

## 2024-09-30 DIAGNOSIS — F51.05 INSOMNIA DUE TO MENTAL CONDITION: ICD-10-CM

## 2024-09-30 DIAGNOSIS — F41.1 GENERALIZED ANXIETY DISORDER: ICD-10-CM

## 2024-09-30 DIAGNOSIS — F33.1 MAJOR DEPRESSIVE DISORDER, RECURRENT EPISODE, MODERATE: Primary | ICD-10-CM

## 2024-09-30 DIAGNOSIS — E11.9 TYPE 2 DIABETES MELLITUS WITHOUT COMPLICATION, WITHOUT LONG-TERM CURRENT USE OF INSULIN: ICD-10-CM

## 2024-09-30 DIAGNOSIS — F43.10 POST TRAUMATIC STRESS DISORDER (PTSD): ICD-10-CM

## 2024-09-30 DIAGNOSIS — Z63.4 BEREAVEMENT: ICD-10-CM

## 2024-09-30 PROCEDURE — 90833 PSYTX W PT W E/M 30 MIN: CPT | Performed by: STUDENT IN AN ORGANIZED HEALTH CARE EDUCATION/TRAINING PROGRAM

## 2024-09-30 PROCEDURE — 99214 OFFICE O/P EST MOD 30 MIN: CPT | Performed by: STUDENT IN AN ORGANIZED HEALTH CARE EDUCATION/TRAINING PROGRAM

## 2024-09-30 RX ORDER — SERTRALINE HYDROCHLORIDE 100 MG/1
100 TABLET, FILM COATED ORAL DAILY
Qty: 90 TABLET | Refills: 3 | Status: SHIPPED | OUTPATIENT
Start: 2024-09-30

## 2024-09-30 SDOH — SOCIAL STABILITY - SOCIAL INSECURITY: DISSAPEARANCE AND DEATH OF FAMILY MEMBER: Z63.4

## 2024-09-30 NOTE — TREATMENT PLAN
Multi-Disciplinary Problems (from Behavioral Health Treatment Plan)      Active Problems       Problem: Anxiety  Start Date: 09/30/24      Problem Details: The patient self-scales this problem as a 2 with 10 being the worst.  work stresses, family conflict, grief        Goal Priority Start Date Expected End Date End Date    Patient will develop and implement behavioral and cognitive strategies to reduce anxiety and irrational fears. -- 09/30/24 03/31/25 --    Goal Details: Progress toward goal:  The patient self-scales their progress related to this goal as a 2 with 10 being the worst.          Goal Intervention Frequency Start Date End Date    Help patient explore past emotional issues in relation to present anxiety. Q Month 09/30/24 --    Intervention Details: Duration of treatment until remission of symptoms.          Goal Intervention Frequency Start Date End Date    Help patient develop an awareness of their cognitive and physical responses to anxiety. Q Month 09/30/24 --    Intervention Details: Duration of treatment until remission of symptoms.                  Problem: Depression  Start Date: 09/30/24      Problem Details: The patient self-scales this problem as a 2 with 10 being the worst.  work stresses, family conflict, grief        Goal Priority Start Date Expected End Date End Date    Patient will demonstrate the ability to initiate new constructive life skills outside of sessions on a consistent basis. -- 09/30/24 03/31/25 --    Goal Details: Progress toward goal:  The patient self-scales their progress related to this goal as a 2 with 10 being the worst.          Goal Intervention Frequency Start Date End Date    Assist patient in setting attainable activities of daily living goals. PRN 09/30/24 --      Goal Intervention Frequency Start Date End Date    Provide education about depression Q Month 09/30/24 --    Intervention Details: Duration of treatment until remission of symptoms.          Goal  Intervention Frequency Start Date End Date    Assist patient in developing healthy coping strategies. Q Month 09/30/24 --    Intervention Details: Duration of treatment until remission of symptoms.                          Reviewed By       Izzy Narayan MD 09/30/24 [id number]                     I have discussed and reviewed this treatment plan with the patient.

## 2024-09-30 NOTE — PLAN OF CARE
[facility] to help manage depression and anxiety related to work stresses, family conflict, grief

## 2024-10-01 RX ORDER — METFORMIN HCL 500 MG
500 TABLET, EXTENDED RELEASE 24 HR ORAL 2 TIMES DAILY
Qty: 180 TABLET | Refills: 1 | Status: SHIPPED | OUTPATIENT
Start: 2024-10-01

## 2024-10-01 NOTE — TELEPHONE ENCOUNTER
UPCOMING APPTS  No upcoming appointments  LAST OFFICE VISIT - THIS DEPT  10/3/2023 Daniel Mares, APRN

## 2024-10-08 DIAGNOSIS — E11.65 TYPE 2 DIABETES MELLITUS WITH HYPERGLYCEMIA, WITHOUT LONG-TERM CURRENT USE OF INSULIN: ICD-10-CM

## 2024-10-08 RX ORDER — TIRZEPATIDE 5 MG/.5ML
INJECTION, SOLUTION SUBCUTANEOUS
Qty: 2 ML | Refills: 12 | Status: SHIPPED | OUTPATIENT
Start: 2024-10-08

## 2024-10-14 DIAGNOSIS — I73.9 CLAUDICATION: ICD-10-CM

## 2024-10-14 RX ORDER — MONTELUKAST SODIUM 10 MG/1
10 TABLET ORAL EVERY EVENING
Qty: 30 TABLET | Refills: 0 | Status: SHIPPED | OUTPATIENT
Start: 2024-10-14

## 2024-10-15 RX ORDER — CILOSTAZOL 100 MG/1
100 TABLET ORAL 2 TIMES DAILY
Qty: 180 TABLET | Refills: 1 | Status: SHIPPED | OUTPATIENT
Start: 2024-10-15

## 2024-10-21 RX ORDER — IPRATROPIUM BROMIDE 21 UG/1
2 SPRAY, METERED NASAL EVERY 12 HOURS
Qty: 30 ML | Refills: 2 | Status: SHIPPED | OUTPATIENT
Start: 2024-10-21

## 2024-11-18 DIAGNOSIS — E03.9 HYPOTHYROIDISM, UNSPECIFIED TYPE: ICD-10-CM

## 2024-11-18 DIAGNOSIS — I49.8 VENTRICULAR TRIGEMINY: ICD-10-CM

## 2024-11-18 DIAGNOSIS — E11.9 TYPE 2 DIABETES MELLITUS WITHOUT COMPLICATION, WITHOUT LONG-TERM CURRENT USE OF INSULIN: ICD-10-CM

## 2024-11-18 RX ORDER — MONTELUKAST SODIUM 10 MG/1
10 TABLET ORAL EVERY EVENING
Qty: 30 TABLET | Refills: 0 | OUTPATIENT
Start: 2024-11-18

## 2024-11-18 RX ORDER — LEVOTHYROXINE SODIUM 50 MCG
50 TABLET ORAL DAILY
Qty: 90 TABLET | Refills: 1 | OUTPATIENT
Start: 2024-11-18

## 2024-11-18 RX ORDER — METOPROLOL SUCCINATE 25 MG/1
25 TABLET, EXTENDED RELEASE ORAL DAILY
Qty: 90 TABLET | Refills: 3 | Status: SHIPPED | OUTPATIENT
Start: 2024-11-18

## 2024-12-07 ENCOUNTER — HOSPITAL ENCOUNTER (EMERGENCY)
Facility: HOSPITAL | Age: 59
Discharge: HOME | End: 2024-12-07
Payer: OTHER GOVERNMENT

## 2024-12-07 VITALS
TEMPERATURE: 98.2 F | HEART RATE: 74 BPM | SYSTOLIC BLOOD PRESSURE: 129 MMHG | DIASTOLIC BLOOD PRESSURE: 91 MMHG | RESPIRATION RATE: 18 BRPM | OXYGEN SATURATION: 99 %

## 2024-12-07 PROCEDURE — 99202 OFFICE O/P NEW SF 15 MIN: CPT

## 2024-12-23 ENCOUNTER — TELEMEDICINE (OUTPATIENT)
Dept: PSYCHIATRY | Facility: CLINIC | Age: 59
End: 2024-12-23
Payer: OTHER GOVERNMENT

## 2024-12-23 DIAGNOSIS — F43.10 POST TRAUMATIC STRESS DISORDER (PTSD): ICD-10-CM

## 2024-12-23 DIAGNOSIS — F33.1 MAJOR DEPRESSIVE DISORDER, RECURRENT EPISODE, MODERATE: Primary | ICD-10-CM

## 2024-12-23 DIAGNOSIS — F41.1 GENERALIZED ANXIETY DISORDER: ICD-10-CM

## 2024-12-23 DIAGNOSIS — F51.05 INSOMNIA DUE TO MENTAL CONDITION: ICD-10-CM

## 2024-12-23 DIAGNOSIS — Z63.4 BEREAVEMENT: ICD-10-CM

## 2024-12-23 RX ORDER — ESZOPICLONE 3 MG/1
3 TABLET, FILM COATED ORAL NIGHTLY
Qty: 30 TABLET | Refills: 5 | Status: SHIPPED | OUTPATIENT
Start: 2024-12-23

## 2024-12-23 SDOH — SOCIAL STABILITY - SOCIAL INSECURITY: DISSAPEARANCE AND DEATH OF FAMILY MEMBER: Z63.4
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
## 2024-12-26 DIAGNOSIS — G62.9 NEUROPATHY: ICD-10-CM

## 2024-12-26 DIAGNOSIS — M62.838 MUSCLE SPASM: ICD-10-CM

## 2024-12-26 NOTE — TELEPHONE ENCOUNTER
LOV: 10/03/2023  UDS: 03/15/2024    Message sent to patient to schedule visit as no next visit scheduled.

## 2024-12-27 RX ORDER — IPRATROPIUM BROMIDE 21 UG/1
2 SPRAY, METERED NASAL EVERY 12 HOURS
Qty: 30 ML | Refills: 2 | Status: SHIPPED | OUTPATIENT
Start: 2024-12-27

## 2024-12-27 RX ORDER — CYCLOBENZAPRINE HCL 10 MG
10 TABLET ORAL 2 TIMES DAILY PRN
Qty: 30 TABLET | Refills: 0 | Status: SHIPPED | OUTPATIENT
Start: 2024-12-27

## 2024-12-27 RX ORDER — EZETIMIBE 10 MG/1
10 TABLET ORAL DAILY
Qty: 30 TABLET | Refills: 0 | Status: SHIPPED | OUTPATIENT
Start: 2024-12-27

## 2024-12-27 RX ORDER — GABAPENTIN 300 MG/1
300 CAPSULE ORAL 3 TIMES DAILY
Qty: 90 CAPSULE | Refills: 0 | Status: SHIPPED | OUTPATIENT
Start: 2024-12-27

## 2025-01-01 DIAGNOSIS — E11.9 TYPE 2 DIABETES MELLITUS WITHOUT COMPLICATION, WITHOUT LONG-TERM CURRENT USE OF INSULIN: ICD-10-CM

## 2025-01-01 DIAGNOSIS — I49.8 VENTRICULAR TRIGEMINY: ICD-10-CM

## 2025-01-02 DIAGNOSIS — I49.8 VENTRICULAR TRIGEMINY: ICD-10-CM

## 2025-01-02 RX ORDER — METOPROLOL SUCCINATE 25 MG/1
25 TABLET, EXTENDED RELEASE ORAL DAILY
Qty: 90 TABLET | Refills: 0 | Status: SHIPPED | OUTPATIENT
Start: 2025-01-02

## 2025-01-02 RX ORDER — IPRATROPIUM BROMIDE 21 UG/1
2 SPRAY, METERED NASAL EVERY 12 HOURS
Qty: 30 ML | Refills: 2 | Status: SHIPPED | OUTPATIENT
Start: 2025-01-02

## 2025-01-02 RX ORDER — METOPROLOL SUCCINATE 25 MG/1
25 TABLET, EXTENDED RELEASE ORAL DAILY
Qty: 90 TABLET | Refills: 3 | OUTPATIENT
Start: 2025-01-02

## 2025-01-02 RX ORDER — METFORMIN HYDROCHLORIDE 500 MG/1
500 TABLET, EXTENDED RELEASE ORAL 2 TIMES DAILY
Qty: 180 TABLET | Refills: 1 | Status: SHIPPED | OUTPATIENT
Start: 2025-01-02

## 2025-01-02 RX ORDER — ONDANSETRON 4 MG/1
4 TABLET, FILM COATED ORAL EVERY 8 HOURS PRN
Qty: 30 TABLET | Refills: 0 | Status: SHIPPED | OUTPATIENT
Start: 2025-01-02

## 2025-01-02 NOTE — TELEPHONE ENCOUNTER
Caller: Clementina Rios RODOLFO    Relationship: Self    Best call back number: [phone]     Requested Prescriptions:   Requested Prescriptions     Pending Prescriptions Disp Refills    metoprolol succinate XL (TOPROL-XL) 25 MG 24 hr tablet 90 tablet 3     Sig: Take 1 tablet by mouth Daily.        Pharmacy where request should be sent: [address] [phone] [facility] [phone]      Last office visit with prescribing clinician: 3/6/2024   Last telemedicine visit with prescribing clinician: Visit date not found   Next office visit with prescribing clinician: Visit date not found     Additional details provided by patient: 90 DAY SUPPLY PLEASE. THANK YOU!    Does the patient have less than a 3 day supply:  [] Yes  [x] No    Would you like a call back once the refill request has been completed: [] Yes [] No    If the office needs to give you a call back, can they leave a voicemail: [] Yes [] No    Trish Henderson Rep   01/02/25 10:03 EST

## 2025-01-08 ENCOUNTER — OFFICE VISIT (OUTPATIENT)
Dept: FAMILY MEDICINE CLINIC | Facility: CLINIC | Age: 60
End: 2025-01-08
Payer: OTHER GOVERNMENT

## 2025-01-08 VITALS
TEMPERATURE: 98 F | BODY MASS INDEX: 32.78 KG/M2 | OXYGEN SATURATION: 98 % | HEART RATE: 82 BPM | HEIGHT: 66 IN | DIASTOLIC BLOOD PRESSURE: 86 MMHG | WEIGHT: 204 LBS | SYSTOLIC BLOOD PRESSURE: 130 MMHG | RESPIRATION RATE: 22 BRPM

## 2025-01-08 DIAGNOSIS — E11.9 TYPE 2 DIABETES MELLITUS WITHOUT COMPLICATION, WITHOUT LONG-TERM CURRENT USE OF INSULIN: ICD-10-CM

## 2025-01-08 DIAGNOSIS — J30.9 ALLERGIC RHINITIS, UNSPECIFIED SEASONALITY, UNSPECIFIED TRIGGER: ICD-10-CM

## 2025-01-08 DIAGNOSIS — J01.00 ACUTE NON-RECURRENT MAXILLARY SINUSITIS: ICD-10-CM

## 2025-01-08 DIAGNOSIS — R05.1 ACUTE COUGH: Primary | ICD-10-CM

## 2025-01-08 DIAGNOSIS — R09.81 SINUS CONGESTION: ICD-10-CM

## 2025-01-08 LAB
EXPIRATION DATE: NORMAL
FLUAV AG UPPER RESP QL IA.RAPID: NOT DETECTED
FLUBV AG UPPER RESP QL IA.RAPID: NOT DETECTED
INTERNAL CONTROL: NORMAL
Lab: NORMAL
SARS-COV-2 AG UPPER RESP QL IA.RAPID: NOT DETECTED

## 2025-01-08 PROCEDURE — 99214 OFFICE O/P EST MOD 30 MIN: CPT | Performed by: NURSE PRACTITIONER

## 2025-01-08 PROCEDURE — 87428 SARSCOV & INF VIR A&B AG IA: CPT | Performed by: NURSE PRACTITIONER

## 2025-01-08 RX ORDER — PREDNISONE 20 MG/1
20 TABLET ORAL DAILY
Qty: 5 TABLET | Refills: 0 | Status: SHIPPED | OUTPATIENT
Start: 2025-01-08 | End: 2025-01-13

## 2025-01-08 RX ORDER — MONTELUKAST SODIUM 10 MG/1
10 TABLET ORAL EVERY EVENING
Qty: 90 TABLET | Refills: 1 | Status: SHIPPED | OUTPATIENT
Start: 2025-01-08

## 2025-01-08 NOTE — PROGRESS NOTES
"Chief Complaint  Cough (sinu) and Nasal Congestion (6 days )    Subjective         Clementina Rios presents to [facility]  Patient presents to the office today with complaints of cough and congestion.  She states that she is also been having sinus pressure and pain.  Patient states symptoms have been present for 6 days.  She denies any fevers at this time.  She denies any general body aches or nausea vomiting diarrhea.  Blood pressure is 130/86.  She denies any chest pain shortness breath palpitations at this time.  I did explain that she is due for her routine lab work for her diabetes.  She does state that she would like to switch her GLP to something that [place] carries.  She states the Trulicity does not work well for her.  I did discuss switching her to Ozempic.  She also states that she is needing a refill on her Singulair.    Cough    Sore Throat   Associated symptoms include coughing.       Objective     Vitals:    01/08/25 0836   BP: 130/86   Pulse: 82   Resp: 22   Temp: 98 °F (36.7 °C)   SpO2: 98%   Weight: 92.5 kg (204 lb)   Height: 167.6 cm (66\")      Body mass index is 32.93 kg/m².    BMI is >= 30 and <35. (Class 1 Obesity). The following options were offered after discussion;: nutrition counseling/recommendations        Physical Exam  Vitals reviewed.   Constitutional:       Appearance: Normal appearance.   HENT:      Nose: Congestion and rhinorrhea present.      Right Sinus: Maxillary sinus tenderness present.      Left Sinus: Maxillary sinus tenderness present.   Cardiovascular:      Rate and Rhythm: Normal rate and regular rhythm.      Pulses: Normal pulses.      Heart sounds: Normal heart sounds, S1 normal and S2 normal. No murmur heard.  Pulmonary:      Effort: Pulmonary effort is normal. No respiratory distress.      Breath sounds: Normal breath sounds.   Skin:     General: Skin is warm and dry.   Neurological:      Mental Status: She is alert and oriented " to person, place, and time.   Psychiatric:         Attention and Perception: Attention normal.         Mood and Affect: Mood normal.         Behavior: Behavior normal.          Result Review :   The following data was reviewed by: ABILIO Dickerson on 01/08/2025:      Procedures    Assessment and Plan   Diagnoses and all orders for this visit:    1. Acute cough (Primary)  -     POCT SARS-CoV-2 Antigen ZARIA + Flu    2. Sinus congestion  -     POCT SARS-CoV-2 Antigen ZARIA + Flu  -     amoxicillin-clavulanate (AUGMENTIN) 875-125 MG per tablet; Take 1 tablet by mouth 2 (Two) Times a Day for 10 days.  Dispense: 20 tablet; Refill: 0  -     predniSONE (DELTASONE) 20 MG tablet; Take 1 tablet by mouth Daily for 5 days.  Dispense: 5 tablet; Refill: 0    3. Type 2 diabetes mellitus without complication, without long-term current use of insulin  -     Semaglutide,0.25 or 0.5MG/DOS, (OZEMPIC) 2 MG/3ML solution pen-injector; Inject 0.5 mg under the skin into the appropriate area as directed 1 (One) Time Per Week.  Dispense: 9 mL; Refill: 0    4. Allergic rhinitis, unspecified seasonality, unspecified trigger  -     montelukast (SINGULAIR) 10 MG tablet; Take 1 tablet by mouth Every Evening.  Dispense: 90 tablet; Refill: 1    5. Acute non-recurrent maxillary sinusitis  -     amoxicillin-clavulanate (AUGMENTIN) 875-125 MG per tablet; Take 1 tablet by mouth 2 (Two) Times a Day for 10 days.  Dispense: 20 tablet; Refill: 0  -     predniSONE (DELTASONE) 20 MG tablet; Take 1 tablet by mouth Daily for 5 days.  Dispense: 5 tablet; Refill: 0          Follow Up   Return in about 6 months (around 7/8/2025) for Recheck.  Patient was given instructions and counseling regarding her condition or for health maintenance advice. Please see specific information pulled into the AVS if appropriate.

## 2025-01-29 ENCOUNTER — TELEPHONE (OUTPATIENT)
Dept: FAMILY MEDICINE CLINIC | Facility: CLINIC | Age: 60
End: 2025-01-29
Payer: OTHER GOVERNMENT

## 2025-01-29 DIAGNOSIS — E78.5 HYPERLIPIDEMIA, UNSPECIFIED HYPERLIPIDEMIA TYPE: ICD-10-CM

## 2025-01-29 DIAGNOSIS — E03.9 HYPOTHYROIDISM, UNSPECIFIED TYPE: ICD-10-CM

## 2025-01-29 DIAGNOSIS — E11.9 TYPE 2 DIABETES MELLITUS WITHOUT COMPLICATION, WITHOUT LONG-TERM CURRENT USE OF INSULIN: Primary | ICD-10-CM

## 2025-01-29 NOTE — TELEPHONE ENCOUNTER
Pt went to lab and no lab orders placed. She was here for visit on 01/08/2025. Lab orders pended, please sign

## 2025-02-05 DIAGNOSIS — G62.9 NEUROPATHY: ICD-10-CM

## 2025-02-05 RX ORDER — GABAPENTIN 300 MG/1
300 CAPSULE ORAL 3 TIMES DAILY
Qty: 90 CAPSULE | Refills: 0 | Status: SHIPPED | OUTPATIENT
Start: 2025-02-05

## 2025-02-05 NOTE — TELEPHONE ENCOUNTER
Upcoming Appts  No upcoming appointments  Last Office Visit - This Dept  1/8/2025 Daniel Mares APRN    Uds3/15/24

## 2025-02-06 ENCOUNTER — LAB (OUTPATIENT)
Dept: LAB | Facility: HOSPITAL | Age: 60
End: 2025-02-06
Payer: OTHER GOVERNMENT

## 2025-02-06 DIAGNOSIS — E78.5 HYPERLIPIDEMIA, UNSPECIFIED HYPERLIPIDEMIA TYPE: ICD-10-CM

## 2025-02-06 DIAGNOSIS — E11.9 TYPE 2 DIABETES MELLITUS WITHOUT COMPLICATION, WITHOUT LONG-TERM CURRENT USE OF INSULIN: ICD-10-CM

## 2025-02-06 DIAGNOSIS — E03.9 HYPOTHYROIDISM, UNSPECIFIED TYPE: ICD-10-CM

## 2025-02-06 LAB
25(OH)D3 SERPL-MCNC: 35.9 NG/ML (ref 30–100)
ALBUMIN SERPL-MCNC: 3.9 G/DL (ref 3.5–5.2)
ALBUMIN/GLOB SERPL: 1.3 G/DL
ALP SERPL-CCNC: 99 U/L (ref 39–117)
ALT SERPL W P-5'-P-CCNC: 9 U/L (ref 1–33)
ANION GAP SERPL CALCULATED.3IONS-SCNC: 9.6 MMOL/L (ref 5–15)
AST SERPL-CCNC: 12 U/L (ref 1–32)
BILIRUB SERPL-MCNC: 0.3 MG/DL (ref 0–1.2)
BUN SERPL-MCNC: 14 MG/DL (ref 6–20)
BUN/CREAT SERPL: 14.7 (ref 7–25)
CALCIUM SPEC-SCNC: 9.5 MG/DL (ref 8.6–10.5)
CHLORIDE SERPL-SCNC: 105 MMOL/L (ref 98–107)
CHOLEST SERPL-MCNC: 225 MG/DL (ref 0–200)
CO2 SERPL-SCNC: 24.4 MMOL/L (ref 22–29)
CREAT SERPL-MCNC: 0.95 MG/DL (ref 0.57–1)
DEPRECATED RDW RBC AUTO: 42.3 FL (ref 37–54)
EGFRCR SERPLBLD CKD-EPI 2021: 70 ML/MIN/1.73
ERYTHROCYTE [DISTWIDTH] IN BLOOD BY AUTOMATED COUNT: 14.8 % (ref 12.3–15.4)
GLOBULIN UR ELPH-MCNC: 3.1 GM/DL
GLUCOSE SERPL-MCNC: 120 MG/DL (ref 65–99)
HBA1C MFR BLD: 6 % (ref 4.8–5.6)
HCT VFR BLD AUTO: 39.6 % (ref 34–46.6)
HDLC SERPL-MCNC: 44 MG/DL (ref 40–60)
HGB BLD-MCNC: 12.8 G/DL (ref 12–15.9)
LDLC SERPL CALC-MCNC: 147 MG/DL (ref 0–100)
LDLC/HDLC SERPL: 3.27 {RATIO}
MCH RBC QN AUTO: 25.7 PG (ref 26.6–33)
MCHC RBC AUTO-ENTMCNC: 32.3 G/DL (ref 31.5–35.7)
MCV RBC AUTO: 79.4 FL (ref 79–97)
PLATELET # BLD AUTO: 217 10*3/MM3 (ref 140–450)
PMV BLD AUTO: 10.4 FL (ref 6–12)
POTASSIUM SERPL-SCNC: 4 MMOL/L (ref 3.5–5.2)
PROT SERPL-MCNC: 7 G/DL (ref 6–8.5)
RBC # BLD AUTO: 4.99 10*6/MM3 (ref 3.77–5.28)
SODIUM SERPL-SCNC: 139 MMOL/L (ref 136–145)
TRIGL SERPL-MCNC: 185 MG/DL (ref 0–150)
TSH SERPL DL<=0.05 MIU/L-ACNC: 2.42 UIU/ML (ref 0.27–4.2)
VLDLC SERPL-MCNC: 34 MG/DL (ref 5–40)
WBC NRBC COR # BLD AUTO: 6.95 10*3/MM3 (ref 3.4–10.8)

## 2025-02-06 PROCEDURE — 36415 COLL VENOUS BLD VENIPUNCTURE: CPT

## 2025-02-06 PROCEDURE — 83036 HEMOGLOBIN GLYCOSYLATED A1C: CPT

## 2025-02-06 PROCEDURE — 85027 COMPLETE CBC AUTOMATED: CPT

## 2025-02-06 PROCEDURE — 80061 LIPID PANEL: CPT

## 2025-02-06 PROCEDURE — 80053 COMPREHEN METABOLIC PANEL: CPT

## 2025-02-06 PROCEDURE — 82306 VITAMIN D 25 HYDROXY: CPT

## 2025-02-06 PROCEDURE — 84443 ASSAY THYROID STIM HORMONE: CPT

## 2025-02-06 RX ORDER — EZETIMIBE 10 MG/1
10 TABLET ORAL DAILY
Qty: 90 TABLET | Refills: 1 | Status: SHIPPED | OUTPATIENT
Start: 2025-02-06

## 2025-02-18 DIAGNOSIS — E11.9 TYPE 2 DIABETES MELLITUS WITHOUT COMPLICATION, WITHOUT LONG-TERM CURRENT USE OF INSULIN: ICD-10-CM

## 2025-02-18 DIAGNOSIS — E03.9 HYPOTHYROIDISM, UNSPECIFIED TYPE: ICD-10-CM

## 2025-02-18 RX ORDER — LEVOTHYROXINE SODIUM 50 MCG
50 TABLET ORAL DAILY
Qty: 90 TABLET | Refills: 1 | Status: SHIPPED | OUTPATIENT
Start: 2025-02-18

## 2025-03-03 DIAGNOSIS — E11.9 TYPE 2 DIABETES MELLITUS WITHOUT COMPLICATION, WITHOUT LONG-TERM CURRENT USE OF INSULIN: Primary | ICD-10-CM

## 2025-03-07 DIAGNOSIS — E11.9 TYPE 2 DIABETES MELLITUS WITHOUT COMPLICATION, WITHOUT LONG-TERM CURRENT USE OF INSULIN: ICD-10-CM

## 2025-03-07 NOTE — TELEPHONE ENCOUNTER
Pt requested medication be sent to [facility] pharmacy instead of [facility].

## 2025-03-24 ENCOUNTER — TELEMEDICINE (OUTPATIENT)
Dept: PSYCHIATRY | Facility: CLINIC | Age: 60
End: 2025-03-24
Payer: OTHER GOVERNMENT

## 2025-03-24 ENCOUNTER — TELEPHONE (OUTPATIENT)
Dept: PSYCHIATRY | Facility: CLINIC | Age: 60
End: 2025-03-24

## 2025-03-24 DIAGNOSIS — F43.10 POST TRAUMATIC STRESS DISORDER (PTSD): ICD-10-CM

## 2025-03-24 DIAGNOSIS — F51.05 INSOMNIA DUE TO MENTAL CONDITION: ICD-10-CM

## 2025-03-24 DIAGNOSIS — Z63.4 BEREAVEMENT: ICD-10-CM

## 2025-03-24 DIAGNOSIS — F33.1 MAJOR DEPRESSIVE DISORDER, RECURRENT EPISODE, MODERATE: Primary | ICD-10-CM

## 2025-03-24 DIAGNOSIS — F41.1 GENERALIZED ANXIETY DISORDER: ICD-10-CM

## 2025-03-24 PROCEDURE — 90833 PSYTX W PT W E/M 30 MIN: CPT | Performed by: STUDENT IN AN ORGANIZED HEALTH CARE EDUCATION/TRAINING PROGRAM

## 2025-03-24 PROCEDURE — 99214 OFFICE O/P EST MOD 30 MIN: CPT | Performed by: STUDENT IN AN ORGANIZED HEALTH CARE EDUCATION/TRAINING PROGRAM

## 2025-03-24 SDOH — SOCIAL STABILITY - SOCIAL INSECURITY: DISSAPEARANCE AND DEATH OF FAMILY MEMBER: Z63.4

## 2025-03-24 NOTE — PATIENT INSTRUCTIONS
1.  Please return to clinic at your next scheduled visit.  Contact the clinic ([phone]) at least 24 hours prior in the event you need to cancel.  2.  Do no harm to yourself or others.    3.  Avoid alcohol and drugs.    4.  Take all medications as prescribed.  Please contact the clinic with any concerns. If you are in need of medication refills, please call the clinic at [phone].    5. Should you want to get in touch with your provider, Dr. Izzy Narayan, please utilize [employer] or contact the office ([phone]), and staff will be able to page Dr. Narayan directly.  6.  In the event you have personal crisis, contact the following crisis numbers: Suicide Prevention Hotline 1-222.937.2264; [facility] Helpline 2-061-478-SHKY; [facility] Emergency Room [phone]; text HELLO to 525716; or 338.     Light box/Happy Light/Light Therapy: Obtain a light box, 10,000 lux, put the box about 1 foot away from you, facing you at an angle (not directly), use it daily, right after waking up, for 1 hour daily. Don't stare directly at it. Read, eat, watch tv, etc. Use from September through March. Call your insurance company to see if they can reimburse you for the cost. Available at big box retailers. I used [employer] to get a Protection Plus one.

## 2025-03-24 NOTE — PROGRESS NOTES
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 recommendations include: Prescribed antidepressant medication treatment and Suicide Risk Assessment performed.           TREATMENT PLAN/GOALS: Continue supportive psychotherapy efforts and medications as indicated. Treatment and medication options discussed during today's visit. Patient acknowledged and verbally consented to continue with current treatment plan and was educated on the importance of compliance with treatment and follow-up appointments.    MEDICATION ISSUES:  KRIS reviewed as expected.  Discussed medication options and treatment plan of prescribed medication as well as the risks, benefits, and side effects including potential falls, possible impaired driving and metabolic adversities among others. Patient is agreeable to call the office with any worsening of symptoms or onset of side effects. Patient is agreeable to call 911 or go to the nearest ER should he/she begin having SI/HI. No medication side effects or related complaints today.     MEDS ORDERED DURING VISIT:  No orders of the defined types were placed in this encounter.      Return in about 3 months (around 6/24/2025) for Video visit.         This document has been electronically signed by Izzy Narayan MD  March 24, 2025 15:20 EDT    Dictated Utilizing Dragon Dictation: Part of this note  may be an electronic transcription/translation of spoken language to printed text using the Dragon Dictation System.

## 2025-03-24 NOTE — TREATMENT PLAN
Anxiety:  3/10 progressing    Goals:  Patient will develop and implement behavioral and cognitive strategies to reduce anxiety and irrational fears, monthly, using Rogerian psychotherapy and CBT where appropriate.  Help patient explore past emotional issues in relation to present anxiety, monthly, until remission of symptoms, using Rogerian psychotherapy and CBT where appropriate.  Help patient develop an awareness of their cognitive and physical responses to anxiety, monthly, until remission of symptoms, using Rogerian psychotherapy and CBT where appropriate.          Depression:  3/10 progressing    Goals:  Patient will demonstrate the ability to initiate new constructive life skills outside of sessions on a consistent basis, monthly, using Rogerian psychotherapy and CBT where appropriate.  Assist patient in setting attainable activities of daily living goals, monthly, using Rogerian psychotherapy and CBT where appropriate.  Provide education about depression, monthly, using Rogerian psychotherapy and CBT where appropriate.  Assist patient in developing healthy coping strategies, monthly, using Rogerian psychotherapy and CBT where appropriate.    Rogerian psychotherapy and CBT will be used to help accomplish the above goals and manage depression and anxiety related to work stresses, family conflict, grief       I have discussed and reviewed this treatment plan with the patient.      Reviewed by Izzy Narayan MD   03/24/2025

## 2025-03-25 ENCOUNTER — TELEPHONE (OUTPATIENT)
Dept: PSYCHIATRY | Facility: CLINIC | Age: 60
End: 2025-03-25
Payer: OTHER GOVERNMENT

## 2025-04-06 DIAGNOSIS — I49.8 VENTRICULAR TRIGEMINY: ICD-10-CM

## 2025-04-06 DIAGNOSIS — J30.9 ALLERGIC RHINITIS, UNSPECIFIED SEASONALITY, UNSPECIFIED TRIGGER: ICD-10-CM

## 2025-04-07 RX ORDER — MONTELUKAST SODIUM 10 MG/1
10 TABLET ORAL EVERY EVENING
Qty: 90 TABLET | Refills: 1 | Status: SHIPPED | OUTPATIENT
Start: 2025-04-07

## 2025-04-07 RX ORDER — METOPROLOL SUCCINATE 25 MG/1
25 TABLET, EXTENDED RELEASE ORAL DAILY
Qty: 90 TABLET | Refills: 0 | OUTPATIENT
Start: 2025-04-07

## 2025-04-08 ENCOUNTER — TELEPHONE (OUTPATIENT)
Dept: CARDIOLOGY | Facility: CLINIC | Age: 60
End: 2025-04-08
Payer: OTHER GOVERNMENT

## 2025-04-08 NOTE — TELEPHONE ENCOUNTER
Caller: Clementina Rios    Relationship to patient: Self    Best call back number: [phone]     Chief complaint: MEDICATION REFILL     Type of visit: FOLLOW UP     Requested date: SOON     If rescheduling, when is the original appointment:      Additional notes: PATIENT IS OUT OF MEDICATION

## 2025-04-09 DIAGNOSIS — I49.8 VENTRICULAR TRIGEMINY: ICD-10-CM

## 2025-04-09 RX ORDER — METOPROLOL SUCCINATE 25 MG/1
25 TABLET, EXTENDED RELEASE ORAL DAILY
Qty: 90 TABLET | Refills: 0 | Status: SHIPPED | OUTPATIENT
Start: 2025-04-09

## 2025-04-09 NOTE — TELEPHONE ENCOUNTER
Patient was last seen on 3/6/24, she does have an upcoming appt on 4/22/25, ok to send in a refill to get her to her appt?

## 2025-04-10 ENCOUNTER — LAB (OUTPATIENT)
Dept: LAB | Facility: HOSPITAL | Age: 60
End: 2025-04-10
Payer: OTHER GOVERNMENT

## 2025-04-10 DIAGNOSIS — F51.05 INSOMNIA DUE TO MENTAL CONDITION: ICD-10-CM

## 2025-04-10 LAB
AMPHET+METHAMPHET UR QL: NEGATIVE
AMPHETAMINES UR QL: NEGATIVE
BARBITURATES UR QL SCN: NEGATIVE
BENZODIAZ UR QL SCN: NEGATIVE
BUPRENORPHINE SERPL-MCNC: NEGATIVE NG/ML
CANNABINOIDS SERPL QL: NEGATIVE
COCAINE UR QL: NEGATIVE
FENTANYL UR-MCNC: NEGATIVE NG/ML
METHADONE UR QL SCN: NEGATIVE
OPIATES UR QL: NEGATIVE
OXYCODONE UR QL SCN: NEGATIVE
PCP UR QL SCN: NEGATIVE
TRICYCLICS UR QL SCN: NEGATIVE

## 2025-04-10 PROCEDURE — 80307 DRUG TEST PRSMV CHEM ANLYZR: CPT

## 2025-04-16 DIAGNOSIS — E11.9 TYPE 2 DIABETES MELLITUS WITHOUT COMPLICATION, WITHOUT LONG-TERM CURRENT USE OF INSULIN: Primary | ICD-10-CM

## 2025-04-21 DIAGNOSIS — G62.9 NEUROPATHY: ICD-10-CM

## 2025-04-21 RX ORDER — GABAPENTIN 300 MG/1
300 CAPSULE ORAL 3 TIMES DAILY
Qty: 90 CAPSULE | Refills: 0 | Status: SHIPPED | OUTPATIENT
Start: 2025-04-21

## 2025-04-22 ENCOUNTER — OFFICE VISIT (OUTPATIENT)
Dept: CARDIOLOGY | Facility: CLINIC | Age: 60
End: 2025-04-22
Payer: OTHER GOVERNMENT

## 2025-04-22 VITALS
DIASTOLIC BLOOD PRESSURE: 71 MMHG | SYSTOLIC BLOOD PRESSURE: 133 MMHG | HEART RATE: 72 BPM | WEIGHT: 200.6 LBS | BODY MASS INDEX: 32.24 KG/M2 | HEIGHT: 66 IN

## 2025-04-22 DIAGNOSIS — I73.9 CLAUDICATION: ICD-10-CM

## 2025-04-22 DIAGNOSIS — I49.8 VENTRICULAR TRIGEMINY: ICD-10-CM

## 2025-04-22 PROCEDURE — 99213 OFFICE O/P EST LOW 20 MIN: CPT | Performed by: NURSE PRACTITIONER

## 2025-04-22 RX ORDER — METOPROLOL SUCCINATE 25 MG/1
25 TABLET, EXTENDED RELEASE ORAL DAILY
Qty: 90 TABLET | Refills: 3 | Status: SHIPPED | OUTPATIENT
Start: 2025-04-22 | End: 2025-04-22

## 2025-04-22 RX ORDER — PROPRANOLOL HYDROCHLORIDE 80 MG/1
80 CAPSULE, EXTENDED RELEASE ORAL DAILY
Qty: 90 CAPSULE | Refills: 3 | Status: SHIPPED | OUTPATIENT
Start: 2025-04-22

## 2025-04-22 NOTE — PROGRESS NOTES
Chief Complaint  Follow-up, Hypertension, and Ventricular trigeminy    Subjective            History of Present Illness  Clementina Rios is a 60-year-old female patient who presents to the office today for follow up.    History of Present Illness  The patient presents for a follow-up of palpitations.    She has been previously diagnosed with ventricular trigeminy and is currently on a daily regimen of metoprolol 25 mg. She reports that this medication effectively manages her symptoms, although she occasionally experiences episodes of heart pounding upon awakening at night. These episodes are less frequent than before the initiation of metoprolol. She does not take additional doses of the medication during these episodes. She also mentions experiencing hand tremors, which she attributes to the beta blocker, a side effect she has previously encountered when she was on a beta blocker for blood pressure. She typically takes her medication around 4:00 PM daily.    MEDICATIONS  Current: Metoprolol        PMH  Past Medical History:   Diagnosis Date    Abnormal ECG     Deep vein thrombosis     Diabetes mellitus     HBP (high blood pressure)     Head injury     Hemorrhoids     History of cystocele     HLD (hyperlipidemia)     Night sweat     Renal calculus or stone     Seasonal allergies     Sinus trouble     Sinusitis, acute     DIONI (stress urinary incontinence, female)          ALLERGY  Allergies   Allergen Reactions    Amitriptyline Unknown - Low Severity    Codeine Rash          SURGICALHX  Past Surgical History:   Procedure Laterality Date    BLADDER REPAIR  2014    COLON RESECTION      COLONOSCOPY      HYSTERECTOMY      KNEE MENISCAL REPAIR      LAPAROTOMY OOPHERECTOMY      VEIN SURGERY  removed leg vain 2008          SOC  Social History     Socioeconomic History    Marital status:    Tobacco Use    Smoking status: Never    Smokeless tobacco: Never   Vaping Use    Vaping status: Never Used   Substance and  Sexual Activity    Alcohol use: Never    Drug use: Never    Sexual activity: Not Currently     Birth control/protection: None, Hysterectomy         FAMHX  Family History   Problem Relation Age of Onset    Depression Mother     Heart disease Mother     Hypertension Mother     Diabetes Father     Heart failure Father     Paranoid behavior Sister     Drug abuse Sister     Paranoid behavior Brother     Drug abuse Brother     No Known Problems Maternal Aunt     No Known Problems Paternal Aunt     No Known Problems Maternal Uncle     No Known Problems Paternal Uncle     No Known Problems Maternal Grandfather     No Known Problems Maternal Grandmother     No Known Problems Paternal Grandfather     Dementia Paternal Grandmother     No Known Problems Cousin     Other Daughter         Renal Calculus     ADD / ADHD Other     Alcohol abuse Neg Hx     Anxiety disorder Neg Hx     Bipolar disorder Neg Hx     OCD Neg Hx     Schizophrenia Neg Hx     Seizures Neg Hx     Self-Injurious Behavior  Neg Hx     Suicide Attempts Neg Hx           MEDSIGONLY  Current Outpatient Medications on File Prior to Visit   Medication Sig    cilostazol (PLETAL) 100 MG tablet Take 1 tablet by mouth 2 (Two) Times a Day.    cyclobenzaprine (FLEXERIL) 10 MG tablet Take 1 tablet by mouth 2 (Two) Times a Day As Needed for Muscle Spasms.    empagliflozin (Jardiance) 25 MG tablet tablet Take 1 tablet by mouth Daily.    eszopiclone (LUNESTA) 3 MG tablet Take 1 tablet by mouth Every Night.    ezetimibe (ZETIA) 10 MG tablet Take 1 tablet by mouth Daily.    fluticasone (FLONASE) 50 MCG/ACT nasal spray 2 sprays into the nostril(s) as directed by provider Daily As Needed for Rhinitis for up to 90 days.    gabapentin (NEURONTIN) 300 MG capsule Take 1 capsule by mouth 3 (Three) Times a Day.    ipratropium (ATROVENT) 0.03 % nasal spray Administer 2 sprays into the nostril(s) as directed by provider Every 12 (Twelve) Hours.    metFORMIN ER (GLUCOPHAGE-XR) 500 MG 24 hr  "tablet Take 1 tablet by mouth 2 (Two) Times a Day.    metoprolol succinate XL (TOPROL-XL) 25 MG 24 hr tablet Take 1 tablet by mouth Daily.    montelukast (SINGULAIR) 10 MG tablet Take 1 tablet by mouth Every Evening.    ondansetron (Zofran) 4 MG tablet Take 1 tablet by mouth Every 8 (Eight) Hours As Needed for Nausea or Vomiting.    sertraline (ZOLOFT) 100 MG tablet Take 1 tablet by mouth Daily.    sertraline (Zoloft) 50 MG tablet Take 1 tablet by mouth Daily.    Synthroid 50 MCG tablet Take 1 tablet by mouth Daily.    Tirzepatide 7.5 MG/0.5ML solution auto-injector Inject 7.5 mg under the skin into the appropriate area as directed 1 (One) Time Per Week.    Tretinoin, Facial Wrinkles, (Tretinoin, Emollient,) 0.05 % cream Apply 1 Application topically Every Night.     No current facility-administered medications on file prior to visit.         Objective   /71   Pulse 72   Ht 167.6 cm (65.98\")   Wt 91 kg (200 lb 9.6 oz)   BMI 32.39 kg/m²       Physical Exam  Constitutional:       Appearance: She is obese.   HENT:      Head: Normocephalic.   Neck:      Vascular: No carotid bruit.   Cardiovascular:      Rate and Rhythm: Normal rate and regular rhythm.      Pulses: Normal pulses.      Heart sounds: Normal heart sounds. No murmur heard.  Pulmonary:      Effort: Pulmonary effort is normal.      Breath sounds: Normal breath sounds.   Musculoskeletal:      Cervical back: Neck supple.      Right lower leg: No edema.      Left lower leg: No edema.   Skin:     General: Skin is dry.   Neurological:      Mental Status: She is alert and oriented to person, place, and time.   Psychiatric:         Behavior: Behavior normal.           Result Review :   The following data was reviewed by: ABILIO Campbell on 04/22/2025:  proBNP   Date Value Ref Range Status   03/06/2024 164.0 0.0 - 900.0 pg/mL Final     CMP          7/22/2024    08:12 2/6/2025    08:04   CMP   Glucose 139  120    BUN 9  14    Creatinine 0.75  0.95  " "  EGFR 93.0  70.0    Sodium 141  139    Potassium 3.3  4.0    Chloride 105  105    Calcium 9.3  9.5    Total Protein 6.5  7.0    Albumin 4.3  3.9    Globulin 2.2  3.1    Total Bilirubin 0.3  0.3    Alkaline Phosphatase 93  99    AST (SGOT) 14  12    ALT (SGPT) 13  9    Albumin/Globulin Ratio 2.0  1.3    BUN/Creatinine Ratio 12.0  14.7    Anion Gap 12.1  9.6      CBC w/diff          7/22/2024    08:12 2/6/2025    08:04   CBC w/Diff   WBC 6.99  6.95    RBC 4.88  4.99    Hemoglobin 12.5  12.8    Hematocrit 38.9  39.6    MCV 79.7  79.4    MCH 25.6  25.7    MCHC 32.1  32.3    RDW 15.0  14.8    Platelets 251  217       Lab Results   Component Value Date    TSH 2.420 02/06/2025      Lab Results   Component Value Date    FREET4 1.21 07/22/2024      No results found for: \"DDIMERQUANT\"  Magnesium   Date Value Ref Range Status   07/08/2022 1.9 1.6 - 2.6 mg/dL Final      No results found for: \"DIGOXIN\"   No results found for: \"TROPONINT\"          Results for orders placed during the hospital encounter of 02/21/24    Adult Transthoracic Echo Complete W/ Cont if Necessary Per Protocol    Interpretation Summary    Left ventricular systolic function is normal. Estimated left ventricular EF = 50%    The left ventricular cavity is borderline dilated.    Left ventricular diastolic function was normal.    The left atrial cavity is mildly dilated.           Assessment and Plan    Diagnoses and all orders for this visit:    1. Ventricular trigeminy      Assessment & Plan  1. Ventricular trigeminy.  Her blood pressure and heart rate are within normal limits today. The potential triggers for her condition, including caffeine intake, stress, anxiety, nicotine, and alcohol use, were discussed. She was advised to monitor these factors closely. A prescription for propranolol 80 mg daily was provided, with instructions to take it consistently at the same time each day for a duration of 30 days. She was informed that this medication could also " aid in managing her tremors. If she experiences any adverse effects from the propranolol or if it proves less effective than the metoprolol in controlling her palpitations, she is to inform us immediately.      Follow Up   Return in about 1 year (around 4/22/2026) for Follow up with Dr Robles.    Patient was given instructions and counseling regarding her condition or for health maintenance advice. Please see specific information pulled into the AVS if appropriate.     Clementina Trinhhn  reports that she has never smoked. She has never used smokeless tobacco.          Patient or patient representative verbalized consent for the use of Ambient Listening during the visit with  ABILIO Campbell for chart documentation. 4/25/2025  13:22 EDT    ABILIO Campbell  04/22/25  15:32 EDT    Dictated Utilizing Dragon Dictation

## 2025-04-23 RX ORDER — CILOSTAZOL 100 MG/1
100 TABLET ORAL 2 TIMES DAILY
Qty: 180 TABLET | Refills: 1 | Status: SHIPPED | OUTPATIENT
Start: 2025-04-23

## 2025-05-09 ENCOUNTER — CLINICAL SUPPORT (OUTPATIENT)
Dept: FAMILY MEDICINE CLINIC | Facility: CLINIC | Age: 60
End: 2025-05-09
Payer: OTHER GOVERNMENT

## 2025-05-09 DIAGNOSIS — R05.1 ACUTE COUGH: Primary | ICD-10-CM

## 2025-05-09 DIAGNOSIS — R09.81 SINUS CONGESTION: ICD-10-CM

## 2025-05-09 RX ORDER — AZITHROMYCIN 250 MG/1
TABLET, FILM COATED ORAL
Qty: 6 TABLET | Refills: 0 | Status: SHIPPED | OUTPATIENT
Start: 2025-05-09

## 2025-05-09 RX ORDER — PREDNISONE 20 MG/1
40 TABLET ORAL DAILY
Qty: 10 TABLET | Refills: 0 | Status: SHIPPED | OUTPATIENT
Start: 2025-05-09 | End: 2025-05-14

## 2025-05-21 DIAGNOSIS — E03.9 HYPOTHYROIDISM, UNSPECIFIED TYPE: ICD-10-CM

## 2025-05-21 RX ORDER — ONDANSETRON 4 MG/1
4 TABLET, FILM COATED ORAL EVERY 8 HOURS PRN
Qty: 30 TABLET | Refills: 0 | Status: SHIPPED | OUTPATIENT
Start: 2025-05-21

## 2025-05-21 RX ORDER — LEVOTHYROXINE SODIUM 50 MCG
50 TABLET ORAL DAILY
Qty: 90 TABLET | Refills: 1 | Status: SHIPPED | OUTPATIENT
Start: 2025-05-21

## 2025-05-21 RX ORDER — TRETINOIN 0.5 MG/G
1 CREAM TOPICAL NIGHTLY
Qty: 40 G | Refills: 0 | Status: SHIPPED | OUTPATIENT
Start: 2025-05-21

## 2025-05-24 DIAGNOSIS — E11.9 TYPE 2 DIABETES MELLITUS WITHOUT COMPLICATION, WITHOUT LONG-TERM CURRENT USE OF INSULIN: ICD-10-CM

## 2025-05-24 DIAGNOSIS — E78.5 HYPERLIPIDEMIA, UNSPECIFIED HYPERLIPIDEMIA TYPE: ICD-10-CM

## 2025-05-24 DIAGNOSIS — I10 PRIMARY HYPERTENSION: ICD-10-CM

## 2025-05-24 DIAGNOSIS — E03.9 HYPOTHYROIDISM, UNSPECIFIED TYPE: Primary | ICD-10-CM

## 2025-05-28 ENCOUNTER — LAB (OUTPATIENT)
Dept: LAB | Facility: HOSPITAL | Age: 60
End: 2025-05-28
Payer: OTHER GOVERNMENT

## 2025-05-28 DIAGNOSIS — I10 PRIMARY HYPERTENSION: ICD-10-CM

## 2025-05-28 DIAGNOSIS — E11.9 TYPE 2 DIABETES MELLITUS WITHOUT COMPLICATION, WITHOUT LONG-TERM CURRENT USE OF INSULIN: ICD-10-CM

## 2025-05-28 DIAGNOSIS — E03.9 HYPOTHYROIDISM, UNSPECIFIED TYPE: ICD-10-CM

## 2025-05-28 DIAGNOSIS — E78.5 HYPERLIPIDEMIA, UNSPECIFIED HYPERLIPIDEMIA TYPE: ICD-10-CM

## 2025-05-28 LAB
ALBUMIN SERPL-MCNC: 4.1 G/DL (ref 3.5–5.2)
ALBUMIN/GLOB SERPL: 1.7 G/DL
ALP SERPL-CCNC: 87 U/L (ref 39–117)
ALT SERPL W P-5'-P-CCNC: 10 U/L (ref 1–33)
ANION GAP SERPL CALCULATED.3IONS-SCNC: 8.7 MMOL/L (ref 5–15)
AST SERPL-CCNC: 13 U/L (ref 1–32)
BILIRUB SERPL-MCNC: 0.3 MG/DL (ref 0–1.2)
BUN SERPL-MCNC: 11 MG/DL (ref 6–20)
BUN/CREAT SERPL: 11.1 (ref 7–25)
CALCIUM SPEC-SCNC: 9.2 MG/DL (ref 8.6–10.5)
CHLORIDE SERPL-SCNC: 105 MMOL/L (ref 98–107)
CHOLEST SERPL-MCNC: 217 MG/DL (ref 0–200)
CO2 SERPL-SCNC: 27.3 MMOL/L (ref 22–29)
CREAT SERPL-MCNC: 0.99 MG/DL (ref 0.57–1)
DEPRECATED RDW RBC AUTO: 46 FL (ref 37–54)
EGFRCR SERPLBLD CKD-EPI 2021: 66.2 ML/MIN/1.73
ERYTHROCYTE [DISTWIDTH] IN BLOOD BY AUTOMATED COUNT: 14.7 % (ref 12.3–15.4)
GLOBULIN UR ELPH-MCNC: 2.4 GM/DL
GLUCOSE SERPL-MCNC: 125 MG/DL (ref 65–99)
HBA1C MFR BLD: 5.9 % (ref 4.8–5.6)
HCT VFR BLD AUTO: 42.1 % (ref 34–46.6)
HDLC SERPL-MCNC: 39 MG/DL (ref 40–60)
HGB BLD-MCNC: 13.1 G/DL (ref 12–15.9)
LDLC SERPL CALC-MCNC: 139 MG/DL (ref 0–100)
LDLC/HDLC SERPL: 3.46 {RATIO}
MCH RBC QN AUTO: 26.5 PG (ref 26.6–33)
MCHC RBC AUTO-ENTMCNC: 31.1 G/DL (ref 31.5–35.7)
MCV RBC AUTO: 85.2 FL (ref 79–97)
PLATELET # BLD AUTO: 197 10*3/MM3 (ref 140–450)
PMV BLD AUTO: 10.7 FL (ref 6–12)
POTASSIUM SERPL-SCNC: 3.8 MMOL/L (ref 3.5–5.2)
PROT SERPL-MCNC: 6.5 G/DL (ref 6–8.5)
RBC # BLD AUTO: 4.94 10*6/MM3 (ref 3.77–5.28)
SODIUM SERPL-SCNC: 141 MMOL/L (ref 136–145)
TRIGL SERPL-MCNC: 216 MG/DL (ref 0–150)
TSH SERPL DL<=0.05 MIU/L-ACNC: 2.4 UIU/ML (ref 0.27–4.2)
VLDLC SERPL-MCNC: 39 MG/DL (ref 5–40)
WBC NRBC COR # BLD AUTO: 7.07 10*3/MM3 (ref 3.4–10.8)

## 2025-05-28 PROCEDURE — 84443 ASSAY THYROID STIM HORMONE: CPT

## 2025-05-28 PROCEDURE — 80061 LIPID PANEL: CPT

## 2025-05-28 PROCEDURE — 36415 COLL VENOUS BLD VENIPUNCTURE: CPT

## 2025-05-28 PROCEDURE — 83036 HEMOGLOBIN GLYCOSYLATED A1C: CPT

## 2025-05-28 PROCEDURE — 85027 COMPLETE CBC AUTOMATED: CPT

## 2025-05-28 PROCEDURE — 80053 COMPREHEN METABOLIC PANEL: CPT

## 2025-06-18 DIAGNOSIS — F51.05 INSOMNIA DUE TO MENTAL CONDITION: ICD-10-CM

## 2025-06-18 RX ORDER — ESZOPICLONE 3 MG/1
3 TABLET, FILM COATED ORAL NIGHTLY
Qty: 30 TABLET | Refills: 5 | Status: SHIPPED | OUTPATIENT
Start: 2025-06-23

## 2025-06-18 NOTE — TELEPHONE ENCOUNTER
REFILL REQUEST:    eszopiclone (LUNESTA) 3 MG tablet (12/23/2024)     F/UP: 07/15/2025.  LOV: 03/24/2025.

## 2025-07-07 DIAGNOSIS — J30.9 ALLERGIC RHINITIS, UNSPECIFIED SEASONALITY, UNSPECIFIED TRIGGER: ICD-10-CM

## 2025-07-07 DIAGNOSIS — G62.9 NEUROPATHY: ICD-10-CM

## 2025-07-08 RX ORDER — MONTELUKAST SODIUM 10 MG/1
10 TABLET ORAL EVERY EVENING
Qty: 90 TABLET | Refills: 1 | Status: SHIPPED | OUTPATIENT
Start: 2025-07-08

## 2025-07-08 RX ORDER — GABAPENTIN 300 MG/1
300 CAPSULE ORAL 3 TIMES DAILY
Qty: 90 CAPSULE | Refills: 0 | Status: SHIPPED | OUTPATIENT
Start: 2025-07-08

## 2025-07-08 NOTE — TELEPHONE ENCOUNTER
Upcoming Appts  No upcoming appointments  Last Office Visit - This Dept  1/8/2025 Daniel Mares APRN    [facility] 4/10/2025

## 2025-07-15 ENCOUNTER — TELEMEDICINE (OUTPATIENT)
Dept: PSYCHIATRY | Facility: CLINIC | Age: 60
End: 2025-07-15
Payer: OTHER GOVERNMENT

## 2025-07-15 DIAGNOSIS — F33.1 MAJOR DEPRESSIVE DISORDER, RECURRENT EPISODE, MODERATE: ICD-10-CM

## 2025-07-15 DIAGNOSIS — F43.10 POST TRAUMATIC STRESS DISORDER (PTSD): ICD-10-CM

## 2025-07-15 DIAGNOSIS — Z63.4 BEREAVEMENT: ICD-10-CM

## 2025-07-15 DIAGNOSIS — F51.05 INSOMNIA DUE TO MENTAL CONDITION: Primary | ICD-10-CM

## 2025-07-15 DIAGNOSIS — F41.1 GENERALIZED ANXIETY DISORDER: ICD-10-CM

## 2025-07-15 RX ORDER — SERTRALINE HYDROCHLORIDE 100 MG/1
100 TABLET, FILM COATED ORAL DAILY
Qty: 90 TABLET | Refills: 3 | Status: SHIPPED | OUTPATIENT
Start: 2025-07-15

## 2025-07-15 SDOH — SOCIAL STABILITY - SOCIAL INSECURITY: DISSAPEARANCE AND DEATH OF FAMILY MEMBER: Z63.4

## 2025-07-15 NOTE — PROGRESS NOTES
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 tablet; Take 1 tablet by mouth Daily.  Dispense: 90 tablet; Refill: 3    5. Bereavement  -     sertraline (Zoloft) 50 MG tablet; Take 1 tablet by mouth Daily.  Dispense: 90 tablet; Refill: 3  -     sertraline (ZOLOFT) 100 MG tablet; Take 1 tablet by mouth Daily.  Dispense: 90 tablet; Refill: 3        Visit Diagnoses:    ICD-10-CM ICD-9-CM   1. Insomnia due to mental condition  F51.05 300.9     327.02   2. Major depressive disorder, recurrent episode, moderate  F33.1 296.32   3. Generalized anxiety disorder  F41.1 300.02   4. Post traumatic stress disorder (PTSD)  F43.10 309.81   5. Bereavement  Z63.4 V62.82     07/15/2025: Left her job, loves her new one. Setting boundaries and establishing \"me\" time. CSA sent to pt online. More mindful of what makes her happy. Reiterated getting a light box to start using when September comes around. Also listens to podcasts for . Trip to [place] will give closure. Discussed inherited trauma through the womb during pregnancy; pt doesn't blame herself. She thinks it helps her understand her son's behavior.    Acknowledged and normalized patient's thoughts, feelings, and concerns. Allowed patient to freely discuss and process issues, such as:  Anxiety and depression regarding family conflict/relationships.  Anxiety and depression " "regarding family's well-being.  ... using Rogerian psychotherapeutic techniques including unconditional positive regard, reflective listening, and demonstrating clear empathy, with the goal of ameliorating symptoms and maintaining, restoring, or improving self-esteem, adaptive skills, and ego or psychological functions (Maryann et al. 1991), the long-term goal of which is to develop a better, healthier perspective and help the patient bear their circumstances more easily.  Time (minutes) spent providing supportive psychotherapy: 17  (This time is exclusive to the therapy session and separate from the time spent on activities used to meet the criteria for the E/M service (history, exam, medical decision-making).)  Start: 6:19  Stop: 6:36  Functional status: mild impairment  Treatment plan: Medication management and supportive psychotherapy  Prognosis: good  Progress: stable  3m    03/24/2025: Fairly stable, no changes. Had a difficult January. Discussed going on a vacation in January for about a week (friends in [place] and family in [facility]), and reiterated light box.    12/23/2024: Stable, well, no med changes. 3m    09/30/2024: Stable, well, no med changes. Counseled to start light box. Doing fairly well with her son.    06/05/2024: Improved, now stable. No changes. Praised for setting boundaries at work, which has paid off.    4/29/24: Depressed, anxious increase zoloft.    3/29: Doing fairly well, job stressor saddened her but she's getting better. Continue meds. Saying No. Vacation in [place] to visit youngest son and grandkids, and camping.     12/29: Stable, well.     8/28: Grief plus possible underlying depression. Start light box for seasonal pattern. Cleaning things out of the house (\"purging\"). Close follow up.    6/6: Doing well. No changes now. 3 mos. Declines therapy.    PLAN:  Safety: No acute safety concerns  Therapy: None  Risk Assessment: Risk of self-harm acutely is moderate.  Risk factors include " anxiety disorder, mood disorder, PTSD, and recent psychosocial stressors (pandemic). Protective factors include no family history, denies access to guns/weapons, no present SI, no history of suicide attempts or self-harm in the past, minimal AODA, healthcare seeking, future orientation, willingness to engage in care.  Risk of self-harm chronically is also moderate, but could be further elevated in the event of treatment noncompliance and/or AODA.  Meds:  START BLT sept thru march.  CONTINUE zoloft 150 mg daily. Risks, benefits, alternatives discussed with patient including GI upset, nausea vomiting diarrhea, theoretical decrease of seizure threshold predisposing the patient to a slightly higher seizure risk, headaches, sexual dysfunction, serotonin syndrome, bleeding risk, increased suicidality in patients 24 years and younger, switching to umiar/hypomania.  After discussion of these risks and benefits, the patient voiced understanding and agreed to proceed.  CONTINUE lunesta 3 mg qhs. Risks, benefits, alternatives discussed with patient including sedation, dizziness, falls risk, GI upset, amnesia, grogginess the following day.  Do not use before operating vehicle, vessel, or machine. After discussion of these risks and benefits, the patient voiced understanding and agreed to proceed.  Labs: UDS neg 3/24    Patient screened positive for depression based on a PHQ-9 score of 15 on 3/24/2025. Follow-up recommendations include: Prescribed antidepressant medication treatment and Suicide Risk Assessment performed.           TREATMENT PLAN/GOALS: Continue supportive psychotherapy efforts and medications as indicated. Treatment and medication options discussed during today's visit. Patient acknowledged and verbally consented to continue with current treatment plan and was educated on the importance of compliance with treatment and follow-up appointments.    MEDICATION ISSUES:  KRIS reviewed as expected.  Discussed  medication options and treatment plan of prescribed medication as well as the risks, benefits, and side effects including potential falls, possible impaired driving and metabolic adversities among others. Patient is agreeable to call the office with any worsening of symptoms or onset of side effects. Patient is agreeable to call 911 or go to the nearest ER should he/she begin having SI/HI. No medication side effects or related complaints today.     MEDS ORDERED DURING VISIT:  New Medications Ordered This Visit   Medications    sertraline (Zoloft) 50 MG tablet     Sig: Take 1 tablet by mouth Daily.     Dispense:  90 tablet     Refill:  3     Total dose 150 mg daily. Thank you for the help. Please call with questions: [phone].    sertraline (ZOLOFT) 100 MG tablet     Sig: Take 1 tablet by mouth Daily.     Dispense:  90 tablet     Refill:  3     Total dose 150 mg daily. Thank you for the help. Please call with questions: [phone].       Return in about 3 months (around 10/15/2025) for Video visit.         This document has been electronically signed by Izzy Narayan MD  July 15, 2025 18:37 EDT    Dictated Utilizing Dragon Dictation: Part of this note may be an electronic transcription/translation of spoken language to printed text using the Dragon Dictation System.

## 2025-07-15 NOTE — TREATMENT PLAN
Anxiety:  2/10 progressing    Goals:  Patient will develop and implement behavioral and cognitive strategies to reduce anxiety and irrational fears, monthly, using Rogerian psychotherapy and CBT where appropriate.  Help patient explore past emotional issues in relation to present anxiety, monthly, until remission of symptoms, using Rogerian psychotherapy and CBT where appropriate.  Help patient develop an awareness of their cognitive and physical responses to anxiety, monthly, until remission of symptoms, using Rogerian psychotherapy and CBT where appropriate.          Depression:  2/10 progressing    Goals:  Patient will demonstrate the ability to initiate new constructive life skills outside of sessions on a consistent basis, monthly, using Rogerian psychotherapy and CBT where appropriate.  Assist patient in setting attainable activities of daily living goals, monthly, using Rogerian psychotherapy and CBT where appropriate.  Provide education about depression, monthly, using Rogerian psychotherapy and CBT where appropriate.  Assist patient in developing healthy coping strategies, monthly, using Rogerian psychotherapy and CBT where appropriate.    Rogerian psychotherapy and CBT will be used to help accomplish the above goals and manage depression and anxiety related to work stresses, family conflict, grief        I have discussed and reviewed this treatment plan with the patient.      Reviewed by Izzy Narayan MD   07/15/2025

## 2025-07-18 RX ORDER — LEADER EAR DROPS FOR SWIMMERS 950 MG/ML
3 LIQUID AURICULAR (OTIC) DAILY PRN
Qty: 30 ML | Refills: 0 | Status: SHIPPED | OUTPATIENT
Start: 2025-07-18

## 2025-07-20 DIAGNOSIS — E11.9 TYPE 2 DIABETES MELLITUS WITHOUT COMPLICATION, WITHOUT LONG-TERM CURRENT USE OF INSULIN: ICD-10-CM

## 2025-07-21 RX ORDER — METFORMIN HYDROCHLORIDE 500 MG/1
500 TABLET, EXTENDED RELEASE ORAL 2 TIMES DAILY
Qty: 180 TABLET | Refills: 1 | Status: SHIPPED | OUTPATIENT
Start: 2025-07-21

## 2025-07-21 NOTE — TELEPHONE ENCOUNTER
Upcoming Appts  No upcoming appointments  Last Office Visit - This Dept  1/8/2025 Daniel Mares, APRN

## 2025-08-11 DIAGNOSIS — I73.9 CLAUDICATION: ICD-10-CM

## 2025-08-11 DIAGNOSIS — E03.9 HYPOTHYROIDISM, UNSPECIFIED TYPE: ICD-10-CM

## 2025-08-12 RX ORDER — LEVOTHYROXINE SODIUM 50 MCG
50 TABLET ORAL DAILY
Qty: 90 TABLET | Refills: 1 | Status: SHIPPED | OUTPATIENT
Start: 2025-08-12

## 2025-08-12 RX ORDER — CILOSTAZOL 100 MG/1
100 TABLET ORAL 2 TIMES DAILY
Qty: 180 TABLET | Refills: 1 | Status: SHIPPED | OUTPATIENT
Start: 2025-08-12

## 2025-08-18 DIAGNOSIS — E11.9 TYPE 2 DIABETES MELLITUS WITHOUT COMPLICATION, WITHOUT LONG-TERM CURRENT USE OF INSULIN: ICD-10-CM

## 2025-08-18 DIAGNOSIS — I10 PRIMARY HYPERTENSION: ICD-10-CM

## 2025-08-18 DIAGNOSIS — E03.9 HYPOTHYROIDISM, UNSPECIFIED TYPE: Primary | ICD-10-CM

## 2025-08-18 DIAGNOSIS — E78.5 HYPERLIPIDEMIA, UNSPECIFIED HYPERLIPIDEMIA TYPE: ICD-10-CM

## 2025-08-20 RX ORDER — EZETIMIBE 10 MG/1
10 TABLET ORAL DAILY
Qty: 90 TABLET | Refills: 1 | Status: SHIPPED | OUTPATIENT
Start: 2025-08-20

## 2025-08-28 ENCOUNTER — LAB (OUTPATIENT)
Dept: LAB | Facility: HOSPITAL | Age: 60
End: 2025-08-28
Payer: OTHER GOVERNMENT

## 2025-08-28 DIAGNOSIS — E03.9 HYPOTHYROIDISM, UNSPECIFIED TYPE: ICD-10-CM

## 2025-08-28 DIAGNOSIS — I10 PRIMARY HYPERTENSION: ICD-10-CM

## 2025-08-28 DIAGNOSIS — E78.5 HYPERLIPIDEMIA, UNSPECIFIED HYPERLIPIDEMIA TYPE: ICD-10-CM

## 2025-08-28 DIAGNOSIS — E11.9 TYPE 2 DIABETES MELLITUS WITHOUT COMPLICATION, WITHOUT LONG-TERM CURRENT USE OF INSULIN: ICD-10-CM

## 2025-08-28 LAB
ALBUMIN SERPL-MCNC: 4 G/DL (ref 3.5–5.2)
ALBUMIN/GLOB SERPL: 1.6 G/DL
ALP SERPL-CCNC: 88 U/L (ref 39–117)
ALT SERPL W P-5'-P-CCNC: 8 U/L (ref 1–33)
ANION GAP SERPL CALCULATED.3IONS-SCNC: 13.9 MMOL/L (ref 5–15)
AST SERPL-CCNC: 15 U/L (ref 1–32)
BILIRUB SERPL-MCNC: 0.4 MG/DL (ref 0–1.2)
BUN SERPL-MCNC: 11 MG/DL (ref 6–20)
BUN/CREAT SERPL: 12.2 (ref 7–25)
CALCIUM SPEC-SCNC: 9.2 MG/DL (ref 8.6–10.5)
CHLORIDE SERPL-SCNC: 106 MMOL/L (ref 98–107)
CHOLEST SERPL-MCNC: 231 MG/DL (ref 0–200)
CO2 SERPL-SCNC: 23.1 MMOL/L (ref 22–29)
CREAT SERPL-MCNC: 0.9 MG/DL (ref 0.57–1)
DEPRECATED RDW RBC AUTO: 46.9 FL (ref 37–54)
EGFRCR SERPLBLD CKD-EPI 2021: 74.3 ML/MIN/1.73
ERYTHROCYTE [DISTWIDTH] IN BLOOD BY AUTOMATED COUNT: 15.6 % (ref 12.3–15.4)
GLOBULIN UR ELPH-MCNC: 2.5 GM/DL
GLUCOSE SERPL-MCNC: 118 MG/DL (ref 65–99)
HBA1C MFR BLD: 5.6 % (ref 4.8–5.6)
HCT VFR BLD AUTO: 40.1 % (ref 34–46.6)
HDLC SERPL-MCNC: 41 MG/DL (ref 40–60)
HGB BLD-MCNC: 13.1 G/DL (ref 12–15.9)
LDLC SERPL CALC-MCNC: 156 MG/DL (ref 0–100)
LDLC/HDLC SERPL: 3.72 {RATIO}
MCH RBC QN AUTO: 27 PG (ref 26.6–33)
MCHC RBC AUTO-ENTMCNC: 32.7 G/DL (ref 31.5–35.7)
MCV RBC AUTO: 82.7 FL (ref 79–97)
PLATELET # BLD AUTO: 226 10*3/MM3 (ref 140–450)
PMV BLD AUTO: 10.3 FL (ref 6–12)
POTASSIUM SERPL-SCNC: 3.5 MMOL/L (ref 3.5–5.2)
PROT SERPL-MCNC: 6.5 G/DL (ref 6–8.5)
RBC # BLD AUTO: 4.85 10*6/MM3 (ref 3.77–5.28)
SODIUM SERPL-SCNC: 143 MMOL/L (ref 136–145)
TRIGL SERPL-MCNC: 188 MG/DL (ref 0–150)
TSH SERPL DL<=0.05 MIU/L-ACNC: 1.7 UIU/ML (ref 0.27–4.2)
VLDLC SERPL-MCNC: 34 MG/DL (ref 5–40)
WBC NRBC COR # BLD AUTO: 6.36 10*3/MM3 (ref 3.4–10.8)

## 2025-08-28 PROCEDURE — 36415 COLL VENOUS BLD VENIPUNCTURE: CPT

## 2025-08-28 PROCEDURE — 80061 LIPID PANEL: CPT

## 2025-08-28 PROCEDURE — 80053 COMPREHEN METABOLIC PANEL: CPT

## 2025-08-28 PROCEDURE — 84443 ASSAY THYROID STIM HORMONE: CPT

## 2025-08-28 PROCEDURE — 85027 COMPLETE CBC AUTOMATED: CPT

## 2025-08-28 PROCEDURE — 83036 HEMOGLOBIN GLYCOSYLATED A1C: CPT
